# Patient Record
Sex: MALE | Race: WHITE | NOT HISPANIC OR LATINO | Employment: FULL TIME | URBAN - METROPOLITAN AREA
[De-identification: names, ages, dates, MRNs, and addresses within clinical notes are randomized per-mention and may not be internally consistent; named-entity substitution may affect disease eponyms.]

---

## 2018-03-27 ENCOUNTER — OFFICE VISIT CONVERTED (OUTPATIENT)
Dept: SURGERY | Facility: CLINIC | Age: 58
End: 2018-03-27
Attending: SURGERY

## 2020-03-13 ENCOUNTER — HOSPITAL ENCOUNTER (INPATIENT)
Facility: HOSPITAL | Age: 60
LOS: 2 days | Discharge: HOME | End: 2020-03-15
Attending: INTERNAL MEDICINE | Admitting: INTERNAL MEDICINE

## 2020-03-13 ENCOUNTER — APPOINTMENT (OUTPATIENT)
Dept: CARDIOLOGY | Facility: HOSPITAL | Age: 60
End: 2020-03-13

## 2020-03-13 ENCOUNTER — APPOINTMENT (OUTPATIENT)
Dept: MRI IMAGING | Facility: HOSPITAL | Age: 60
End: 2020-03-13

## 2020-03-13 ENCOUNTER — APPOINTMENT (OUTPATIENT)
Dept: CT IMAGING | Facility: HOSPITAL | Age: 60
End: 2020-03-13

## 2020-03-13 PROBLEM — I48.91 ATRIAL FIBRILLATION: Status: ACTIVE | Noted: 2020-03-13

## 2020-03-13 PROBLEM — I10 HYPERTENSION: Status: ACTIVE | Noted: 2020-03-13

## 2020-03-13 PROBLEM — G45.9 TRANSIENT ISCHEMIC ATTACK (TIA): Status: ACTIVE | Noted: 2020-03-13

## 2020-03-13 PROBLEM — I63.9 CVA (CEREBRAL VASCULAR ACCIDENT) (HCC): Status: ACTIVE | Noted: 2020-03-13

## 2020-03-13 PROBLEM — I48.0 PAROXYSMAL ATRIAL FIBRILLATION (HCC): Status: ACTIVE | Noted: 2020-03-13

## 2020-03-13 LAB
ALBUMIN SERPL-MCNC: 3.9 G/DL (ref 3.5–5.2)
ALBUMIN/GLOB SERPL: 1.4 G/DL
ALP SERPL-CCNC: 110 U/L (ref 39–117)
ALT SERPL W P-5'-P-CCNC: 18 U/L (ref 1–41)
ANION GAP SERPL CALCULATED.3IONS-SCNC: 10.3 MMOL/L (ref 5–15)
AORTIC DIMENSIONLESS INDEX: 1.1 (DI)
AST SERPL-CCNC: 17 U/L (ref 1–40)
BH CV ECHO MEAS - ACS: 2.1 CM
BH CV ECHO MEAS - AO MAX PG: 3 MMHG
BH CV ECHO MEAS - AO MEAN PG (FULL): 0 MMHG
BH CV ECHO MEAS - AO MEAN PG: 2 MMHG
BH CV ECHO MEAS - AO ROOT AREA (BSA CORRECTED): 1.7
BH CV ECHO MEAS - AO ROOT AREA: 10.2 CM^2
BH CV ECHO MEAS - AO ROOT DIAM: 3.6 CM
BH CV ECHO MEAS - AO V2 MAX: 89 CM/SEC
BH CV ECHO MEAS - AO V2 MEAN: 63.7 CM/SEC
BH CV ECHO MEAS - AO V2 VTI: 20.4 CM
BH CV ECHO MEAS - AVA(I,A): 4.5 CM^2
BH CV ECHO MEAS - AVA(I,D): 4.5 CM^2
BH CV ECHO MEAS - BSA(HAYCOCK): 2.2 M^2
BH CV ECHO MEAS - BSA: 2.1 M^2
BH CV ECHO MEAS - BZI_BMI: 30.1 KILOGRAMS/M^2
BH CV ECHO MEAS - BZI_METRIC_HEIGHT: 177.8 CM
BH CV ECHO MEAS - BZI_METRIC_WEIGHT: 95.3 KG
BH CV ECHO MEAS - EDV(CUBED): 110.6 ML
BH CV ECHO MEAS - EDV(MOD-SP2): 81 ML
BH CV ECHO MEAS - EDV(MOD-SP4): 103 ML
BH CV ECHO MEAS - EDV(TEICH): 107.5 ML
BH CV ECHO MEAS - EF(CUBED): 84.1 %
BH CV ECHO MEAS - EF(MOD-BP): 68 %
BH CV ECHO MEAS - EF(MOD-SP2): 65.4 %
BH CV ECHO MEAS - EF(MOD-SP4): 67 %
BH CV ECHO MEAS - EF(TEICH): 77.1 %
BH CV ECHO MEAS - ESV(CUBED): 17.6 ML
BH CV ECHO MEAS - ESV(MOD-SP2): 28 ML
BH CV ECHO MEAS - ESV(MOD-SP4): 34 ML
BH CV ECHO MEAS - ESV(TEICH): 24.6 ML
BH CV ECHO MEAS - FS: 45.8 %
BH CV ECHO MEAS - IVS/LVPW: 0.92
BH CV ECHO MEAS - IVSD: 1.1 CM
BH CV ECHO MEAS - LA DIMENSION: 3.5 CM
BH CV ECHO MEAS - LA/AO: 0.97
BH CV ECHO MEAS - LAT PEAK E' VEL: 10.4 CM/SEC
BH CV ECHO MEAS - LV DIASTOLIC VOL/BSA (35-75): 48.3 ML/M^2
BH CV ECHO MEAS - LV MASS(C)D: 206.4 GRAMS
BH CV ECHO MEAS - LV MASS(C)DI: 96.8 GRAMS/M^2
BH CV ECHO MEAS - LV MEAN PG: 2 MMHG
BH CV ECHO MEAS - LV SYSTOLIC VOL/BSA (12-30): 16 ML/M^2
BH CV ECHO MEAS - LV V1 MAX: 115 CM/SEC
BH CV ECHO MEAS - LV V1 MEAN: 72.3 CM/SEC
BH CV ECHO MEAS - LV V1 VTI: 21.9 CM
BH CV ECHO MEAS - LVIDD: 4.8 CM
BH CV ECHO MEAS - LVIDS: 2.6 CM
BH CV ECHO MEAS - LVLD AP2: 7.5 CM
BH CV ECHO MEAS - LVLD AP4: 8.4 CM
BH CV ECHO MEAS - LVLS AP2: 6.4 CM
BH CV ECHO MEAS - LVLS AP4: 6.3 CM
BH CV ECHO MEAS - LVOT AREA (M): 4.2 CM^2
BH CV ECHO MEAS - LVOT AREA: 4.2 CM^2
BH CV ECHO MEAS - LVOT DIAM: 2.3 CM
BH CV ECHO MEAS - LVPWD: 1.2 CM
BH CV ECHO MEAS - MED PEAK E' VEL: 7.7 CM/SEC
BH CV ECHO MEAS - MV A DUR: 0.2 SEC
BH CV ECHO MEAS - MV A MAX VEL: 76.5 CM/SEC
BH CV ECHO MEAS - MV DEC SLOPE: 209 CM/SEC^2
BH CV ECHO MEAS - MV DEC TIME: 0.21 SEC
BH CV ECHO MEAS - MV E MAX VEL: 73.5 CM/SEC
BH CV ECHO MEAS - MV E/A: 0.96
BH CV ECHO MEAS - MV MEAN PG: 1 MMHG
BH CV ECHO MEAS - MV P1/2T MAX VEL: 77.6 CM/SEC
BH CV ECHO MEAS - MV P1/2T: 108.7 MSEC
BH CV ECHO MEAS - MV V2 MEAN: 51.6 CM/SEC
BH CV ECHO MEAS - MV V2 VTI: 28.4 CM
BH CV ECHO MEAS - MVA P1/2T LCG: 2.8 CM^2
BH CV ECHO MEAS - MVA(P1/2T): 2 CM^2
BH CV ECHO MEAS - MVA(VTI): 3.2 CM^2
BH CV ECHO MEAS - PA ACC SLOPE: 2636 CM/SEC^2
BH CV ECHO MEAS - PA ACC TIME: 0.05 SEC
BH CV ECHO MEAS - PA MAX PG (FULL): 5.4 MMHG
BH CV ECHO MEAS - PA MAX PG: 7.3 MMHG
BH CV ECHO MEAS - PA PR(ACCEL): 57 MMHG
BH CV ECHO MEAS - PA V2 MAX: 135 CM/SEC
BH CV ECHO MEAS - PI END-D VEL: 123 CM/SEC
BH CV ECHO MEAS - PULM A REVS DUR: 0.18 SEC
BH CV ECHO MEAS - PULM A REVS VEL: 26.2 CM/SEC
BH CV ECHO MEAS - PULM DIAS VEL: 30.1 CM/SEC
BH CV ECHO MEAS - PULM S/D: 2.1
BH CV ECHO MEAS - PULM SYS VEL: 64 CM/SEC
BH CV ECHO MEAS - PVA(V,A): 1.8 CM^2
BH CV ECHO MEAS - PVA(V,D): 1.8 CM^2
BH CV ECHO MEAS - QP/QS: 0.52
BH CV ECHO MEAS - RV MAX PG: 1.9 MMHG
BH CV ECHO MEAS - RV MEAN PG: 1 MMHG
BH CV ECHO MEAS - RV V1 MAX: 69.2 CM/SEC
BH CV ECHO MEAS - RV V1 MEAN: 47.8 CM/SEC
BH CV ECHO MEAS - RV V1 VTI: 13.6 CM
BH CV ECHO MEAS - RVOT AREA: 3.5 CM^2
BH CV ECHO MEAS - RVOT DIAM: 2.1 CM
BH CV ECHO MEAS - SI(AO): 97.4 ML/M^2
BH CV ECHO MEAS - SI(CUBED): 43.6 ML/M^2
BH CV ECHO MEAS - SI(LVOT): 42.7 ML/M^2
BH CV ECHO MEAS - SI(MOD-SP2): 24.9 ML/M^2
BH CV ECHO MEAS - SI(MOD-SP4): 32.4 ML/M^2
BH CV ECHO MEAS - SI(TEICH): 38.9 ML/M^2
BH CV ECHO MEAS - SV(AO): 207.6 ML
BH CV ECHO MEAS - SV(CUBED): 93 ML
BH CV ECHO MEAS - SV(LVOT): 91 ML
BH CV ECHO MEAS - SV(MOD-SP2): 53 ML
BH CV ECHO MEAS - SV(MOD-SP4): 69 ML
BH CV ECHO MEAS - SV(RVOT): 47.1 ML
BH CV ECHO MEAS - SV(TEICH): 82.9 ML
BH CV ECHO MEAS - TAPSE (>1.6): 2.3 CM2
BH CV ECHO MEASUREMENTS AVERAGE E/E' RATIO: 8.12
BH CV XLRA - RV BASE: 3.7 CM
BH CV XLRA - RV LENGTH: 6.5 CM
BH CV XLRA - RV MID: 2.5 CM
BH CV XLRA - TDI S': 15 CM/SEC
BILIRUB SERPL-MCNC: 0.3 MG/DL (ref 0.2–1.2)
BUN BLD-MCNC: 11 MG/DL (ref 8–23)
BUN/CREAT SERPL: 14.7 (ref 7–25)
CALCIUM SPEC-SCNC: 8.9 MG/DL (ref 8.6–10.5)
CHLORIDE SERPL-SCNC: 107 MMOL/L (ref 98–107)
CHOLEST SERPL-MCNC: 204 MG/DL (ref 0–200)
CO2 SERPL-SCNC: 22.7 MMOL/L (ref 22–29)
CREAT BLD-MCNC: 0.75 MG/DL (ref 0.76–1.27)
DEPRECATED RDW RBC AUTO: 41 FL (ref 37–54)
ERYTHROCYTE [DISTWIDTH] IN BLOOD BY AUTOMATED COUNT: 12.4 % (ref 12.3–15.4)
FOLATE SERPL-MCNC: 10.4 NG/ML (ref 4.78–24.2)
GFR SERPL CREATININE-BSD FRML MDRD: 106 ML/MIN/1.73
GLOBULIN UR ELPH-MCNC: 2.8 GM/DL
GLUCOSE BLD-MCNC: 144 MG/DL (ref 65–99)
GLUCOSE BLDC GLUCOMTR-MCNC: 105 MG/DL (ref 70–130)
GLUCOSE BLDC GLUCOMTR-MCNC: 130 MG/DL (ref 70–130)
GLUCOSE BLDC GLUCOMTR-MCNC: 69 MG/DL (ref 70–130)
HBA1C MFR BLD: 5.83 % (ref 4.8–5.6)
HCT VFR BLD AUTO: 41.3 % (ref 37.5–51)
HDLC SERPL-MCNC: 45 MG/DL (ref 40–60)
HGB BLD-MCNC: 14.4 G/DL (ref 13–17.7)
LDLC SERPL CALC-MCNC: 144 MG/DL (ref 0–100)
LDLC/HDLC SERPL: 3.21 {RATIO}
LEFT ATRIUM VOLUME INDEX: 27 ML/M2
MAXIMAL PREDICTED HEART RATE: 160 BPM
MCH RBC QN AUTO: 31.8 PG (ref 26.6–33)
MCHC RBC AUTO-ENTMCNC: 34.9 G/DL (ref 31.5–35.7)
MCV RBC AUTO: 91.2 FL (ref 79–97)
PLATELET # BLD AUTO: 223 10*3/MM3 (ref 140–450)
PMV BLD AUTO: 9.2 FL (ref 6–12)
POTASSIUM BLD-SCNC: 3.7 MMOL/L (ref 3.5–5.2)
PROT SERPL-MCNC: 6.7 G/DL (ref 6–8.5)
RBC # BLD AUTO: 4.53 10*6/MM3 (ref 4.14–5.8)
SODIUM BLD-SCNC: 140 MMOL/L (ref 136–145)
STRESS TARGET HR: 136 BPM
TRIGL SERPL-MCNC: 73 MG/DL (ref 0–150)
TSH SERPL DL<=0.05 MIU/L-ACNC: 1.56 UIU/ML (ref 0.27–4.2)
VIT B12 BLD-MCNC: 480 PG/ML (ref 211–946)
VLDLC SERPL-MCNC: 14.6 MG/DL (ref 5–40)
WBC NRBC COR # BLD: 5.98 10*3/MM3 (ref 3.4–10.8)

## 2020-03-13 PROCEDURE — 70498 CT ANGIOGRAPHY NECK: CPT

## 2020-03-13 PROCEDURE — 99253 IP/OBS CNSLTJ NEW/EST LOW 45: CPT | Performed by: PSYCHIATRY & NEUROLOGY

## 2020-03-13 PROCEDURE — 93306 TTE W/DOPPLER COMPLETE: CPT | Performed by: INTERNAL MEDICINE

## 2020-03-13 PROCEDURE — 82607 VITAMIN B-12: CPT | Performed by: PSYCHIATRY & NEUROLOGY

## 2020-03-13 PROCEDURE — 70496 CT ANGIOGRAPHY HEAD: CPT

## 2020-03-13 PROCEDURE — 80053 COMPREHEN METABOLIC PANEL: CPT | Performed by: NURSE PRACTITIONER

## 2020-03-13 PROCEDURE — 99254 IP/OBS CNSLTJ NEW/EST MOD 60: CPT | Performed by: NURSE PRACTITIONER

## 2020-03-13 PROCEDURE — 84443 ASSAY THYROID STIM HORMONE: CPT | Performed by: NURSE PRACTITIONER

## 2020-03-13 PROCEDURE — 97162 PT EVAL MOD COMPLEX 30 MIN: CPT | Performed by: PHYSICAL THERAPIST

## 2020-03-13 PROCEDURE — 93010 ELECTROCARDIOGRAM REPORT: CPT | Performed by: INTERNAL MEDICINE

## 2020-03-13 PROCEDURE — 93306 TTE W/DOPPLER COMPLETE: CPT

## 2020-03-13 PROCEDURE — 82962 GLUCOSE BLOOD TEST: CPT

## 2020-03-13 PROCEDURE — 85027 COMPLETE CBC AUTOMATED: CPT | Performed by: NURSE PRACTITIONER

## 2020-03-13 PROCEDURE — 92610 EVALUATE SWALLOWING FUNCTION: CPT

## 2020-03-13 PROCEDURE — 93005 ELECTROCARDIOGRAM TRACING: CPT | Performed by: NURSE PRACTITIONER

## 2020-03-13 PROCEDURE — 70551 MRI BRAIN STEM W/O DYE: CPT

## 2020-03-13 PROCEDURE — 80061 LIPID PANEL: CPT | Performed by: NURSE PRACTITIONER

## 2020-03-13 PROCEDURE — 0 IOPAMIDOL PER 1 ML: Performed by: INTERNAL MEDICINE

## 2020-03-13 PROCEDURE — 83036 HEMOGLOBIN GLYCOSYLATED A1C: CPT | Performed by: PSYCHIATRY & NEUROLOGY

## 2020-03-13 PROCEDURE — 82746 ASSAY OF FOLIC ACID SERUM: CPT | Performed by: PSYCHIATRY & NEUROLOGY

## 2020-03-13 RX ORDER — LISINOPRIL 40 MG/1
40 TABLET ORAL DAILY
COMMUNITY

## 2020-03-13 RX ORDER — ACETAMINOPHEN 325 MG/1
650 TABLET ORAL EVERY 4 HOURS PRN
Status: DISCONTINUED | OUTPATIENT
Start: 2020-03-13 | End: 2020-03-15 | Stop reason: HOSPADM

## 2020-03-13 RX ORDER — SODIUM CHLORIDE 9 MG/ML
75 INJECTION, SOLUTION INTRAVENOUS CONTINUOUS
Status: DISCONTINUED | OUTPATIENT
Start: 2020-03-13 | End: 2020-03-14

## 2020-03-13 RX ORDER — LABETALOL HYDROCHLORIDE 5 MG/ML
10 INJECTION, SOLUTION INTRAVENOUS
Status: DISCONTINUED | OUTPATIENT
Start: 2020-03-13 | End: 2020-03-15 | Stop reason: HOSPADM

## 2020-03-13 RX ORDER — ALUMINA, MAGNESIA, AND SIMETHICONE 2400; 2400; 240 MG/30ML; MG/30ML; MG/30ML
15 SUSPENSION ORAL EVERY 6 HOURS PRN
Status: DISCONTINUED | OUTPATIENT
Start: 2020-03-13 | End: 2020-03-15 | Stop reason: HOSPADM

## 2020-03-13 RX ORDER — ASPIRIN 300 MG/1
300 SUPPOSITORY RECTAL DAILY
Status: DISCONTINUED | OUTPATIENT
Start: 2020-03-13 | End: 2020-03-13

## 2020-03-13 RX ORDER — BISACODYL 10 MG
10 SUPPOSITORY, RECTAL RECTAL DAILY PRN
Status: DISCONTINUED | OUTPATIENT
Start: 2020-03-13 | End: 2020-03-15 | Stop reason: HOSPADM

## 2020-03-13 RX ORDER — ATORVASTATIN CALCIUM 80 MG/1
80 TABLET, FILM COATED ORAL NIGHTLY
Status: DISCONTINUED | OUTPATIENT
Start: 2020-03-13 | End: 2020-03-15 | Stop reason: HOSPADM

## 2020-03-13 RX ORDER — ONDANSETRON 4 MG/1
4 TABLET, FILM COATED ORAL EVERY 6 HOURS PRN
Status: DISCONTINUED | OUTPATIENT
Start: 2020-03-13 | End: 2020-03-15 | Stop reason: HOSPADM

## 2020-03-13 RX ORDER — ASPIRIN 325 MG
325 TABLET ORAL DAILY
Status: DISCONTINUED | OUTPATIENT
Start: 2020-03-13 | End: 2020-03-13

## 2020-03-13 RX ORDER — ASPIRIN 81 MG/1
81 TABLET ORAL DAILY
Status: DISCONTINUED | OUTPATIENT
Start: 2020-03-13 | End: 2020-03-15 | Stop reason: HOSPADM

## 2020-03-13 RX ORDER — DILTIAZEM HYDROCHLORIDE 240 MG/1
240 CAPSULE, COATED, EXTENDED RELEASE ORAL DAILY
Status: DISCONTINUED | OUTPATIENT
Start: 2020-03-13 | End: 2020-03-15 | Stop reason: HOSPADM

## 2020-03-13 RX ORDER — ACETAMINOPHEN 160 MG/5ML
650 SOLUTION ORAL EVERY 4 HOURS PRN
Status: DISCONTINUED | OUTPATIENT
Start: 2020-03-13 | End: 2020-03-15 | Stop reason: HOSPADM

## 2020-03-13 RX ORDER — SODIUM CHLORIDE 0.9 % (FLUSH) 0.9 %
10 SYRINGE (ML) INJECTION EVERY 12 HOURS SCHEDULED
Status: DISCONTINUED | OUTPATIENT
Start: 2020-03-13 | End: 2020-03-15 | Stop reason: HOSPADM

## 2020-03-13 RX ORDER — ONDANSETRON 2 MG/ML
4 INJECTION INTRAMUSCULAR; INTRAVENOUS EVERY 6 HOURS PRN
Status: DISCONTINUED | OUTPATIENT
Start: 2020-03-13 | End: 2020-03-15 | Stop reason: HOSPADM

## 2020-03-13 RX ORDER — IBUPROFEN 800 MG/1
800 TABLET ORAL 2 TIMES DAILY
COMMUNITY
End: 2020-03-15 | Stop reason: HOSPADM

## 2020-03-13 RX ORDER — ASPIRIN 81 MG/1
81 TABLET, CHEWABLE ORAL DAILY
COMMUNITY
End: 2020-04-09

## 2020-03-13 RX ORDER — CYCLOBENZAPRINE HCL 10 MG
10 TABLET ORAL DAILY
COMMUNITY
End: 2020-03-15 | Stop reason: HOSPADM

## 2020-03-13 RX ORDER — CLOPIDOGREL BISULFATE 75 MG/1
75 TABLET ORAL DAILY
Status: DISCONTINUED | OUTPATIENT
Start: 2020-03-13 | End: 2020-03-13

## 2020-03-13 RX ORDER — ACETAMINOPHEN 650 MG/1
650 SUPPOSITORY RECTAL EVERY 4 HOURS PRN
Status: DISCONTINUED | OUTPATIENT
Start: 2020-03-13 | End: 2020-03-15 | Stop reason: HOSPADM

## 2020-03-13 RX ORDER — SODIUM CHLORIDE 0.9 % (FLUSH) 0.9 %
10 SYRINGE (ML) INJECTION AS NEEDED
Status: DISCONTINUED | OUTPATIENT
Start: 2020-03-13 | End: 2020-03-15 | Stop reason: HOSPADM

## 2020-03-13 RX ORDER — DILTIAZEM HYDROCHLORIDE 240 MG/1
240 CAPSULE, COATED, EXTENDED RELEASE ORAL DAILY
COMMUNITY
End: 2020-05-20

## 2020-03-13 RX ADMIN — IOPAMIDOL 95 ML: 755 INJECTION, SOLUTION INTRAVENOUS at 09:50

## 2020-03-13 RX ADMIN — ATORVASTATIN CALCIUM 80 MG: 80 TABLET, FILM COATED ORAL at 02:15

## 2020-03-13 RX ADMIN — SODIUM CHLORIDE 75 ML/HR: 9 INJECTION, SOLUTION INTRAVENOUS at 01:55

## 2020-03-13 RX ADMIN — SODIUM CHLORIDE, PRESERVATIVE FREE 10 ML: 5 INJECTION INTRAVENOUS at 02:16

## 2020-03-13 RX ADMIN — DILTIAZEM HYDROCHLORIDE 240 MG: 240 CAPSULE, COATED, EXTENDED RELEASE ORAL at 08:12

## 2020-03-13 RX ADMIN — SODIUM CHLORIDE, PRESERVATIVE FREE 10 ML: 5 INJECTION INTRAVENOUS at 20:22

## 2020-03-13 RX ADMIN — ASPIRIN 325 MG: 325 TABLET ORAL at 08:12

## 2020-03-13 RX ADMIN — ATORVASTATIN CALCIUM 80 MG: 80 TABLET, FILM COATED ORAL at 20:22

## 2020-03-13 RX ADMIN — METOPROLOL TARTRATE 25 MG: 25 TABLET ORAL at 20:22

## 2020-03-13 RX ADMIN — SODIUM CHLORIDE, PRESERVATIVE FREE 10 ML: 5 INJECTION INTRAVENOUS at 08:13

## 2020-03-13 RX ADMIN — RIVAROXABAN 20 MG: 20 TABLET, FILM COATED ORAL at 17:29

## 2020-03-13 NOTE — PLAN OF CARE
Problem: Patient Care Overview  Goal: Plan of Care Review  3/13/2020 1803 by Verna Ordonez, RN  Flowsheets (Taken 3/13/2020 1803)  Outcome Summary: Lacunar infarct noted.  ECHO completed and cardio consult for history of afib.  To start xarelto and metoprolol this evening.  No complaints this shift.  3/13/2020 1802 by Verna Ordonez, RN  Outcome: Ongoing (interventions implemented as appropriate)  Flowsheets (Taken 3/13/2020 1800)  Progress: improving  Plan of Care Reviewed With: patient;spouse  Outcome Summary: Pt alert and oriented.  NIH 0.  He stills feel some heaviness in left leg.  ST, PT, OT saw.  CTA and MRI completed.  Results discussed with patient and family per neurology.  Goal: Individualization and Mutuality  Outcome: Ongoing (interventions implemented as appropriate)  Goal: Discharge Needs Assessment  Outcome: Ongoing (interventions implemented as appropriate)  Goal: Interprofessional Rounds/Family Conf  Outcome: Ongoing (interventions implemented as appropriate)     Problem: Stroke (Ischemic) (Adult)  Description  Prevent and manage potential problems includin. acute neurologic deterioration  2. bladder/bowel dysfunction  3. cognitive impairment  4. communication impairment  5. eating/swallowing impairment  6. fever  7. hemodynamic instability  8. motor/sensory impairment  9. muscle tone abnormal  10. respiratory compromise  11. situational response  12. skin breakdown  13. VTE (venous thromboembolism)  Goal: Signs and Symptoms of Listed Potential Problems Will be Absent, Minimized or Managed (Stroke)  Description  Signs and symptoms of listed potential problems will be absent, minimized or managed by discharge/transition of care (reference Stroke (Ischemic) (Adult) CPG).  Outcome: Ongoing (interventions implemented as appropriate)

## 2020-03-13 NOTE — CONSULTS
Neurology Note    Patient:  James Snellen    YOB: 1960    REFERRING PHYSICIAN:  Duane Faust MD    CHIEF COMPLAINT:    Let sided heaviness    HISTORY OF PRESENT ILLNESS:   The patient is a 60 y.o. male with h/o PAF, HTN, on low dose aspirin developed left sided heaviness and numbness on getting up from a nap around 1600 yday. He presented to ED at [facility] last night outside of TPA window where his NIHSS was 0, SBP 140s, CTH negative. He has received Plavix 300 mg, transferred here for further work up. Currently symptoms mostly resolved, has been able to eat and walk w/o help. Denies prior stroke symptoms. -170. He has been in NSR.    Past Medical History:  Past Medical History:   Diagnosis Date   • Afib (CMS/HCC)    • Hypertension        Past Surgical History:  History reviewed. No pertinent surgical history.    Social History:   Social History     Socioeconomic History   • Marital status:      Spouse name: Not on file   • Number of children: Not on file   • Years of education: Not on file   • Highest education level: Not on file   Tobacco Use   • Smoking status: Former Smoker   • Smokeless tobacco: Never Used        Family History:   Family History   Problem Relation Age of Onset   • Transient ischemic attack Mother    • Heart attack Father        Medications Prior to Admission:    Prior to Admission medications    Medication Sig Start Date End Date Taking? Authorizing Provider   aspirin 81 MG chewable tablet Chew 81 mg Daily.   Yes Lora Raymond MD   cyclobenzaprine (FLEXERIL) 10 MG tablet Take 10 mg by mouth Daily. 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   Yes Lora Raymond MD   ibuprofen (ADVIL,MOTRIN) 800 MG tablet Take 800 mg by mouth 2 (Two) Times a Day.   Yes Lora Raymond MD   lisinopril (PRINIVIL,ZESTRIL)  40 MG tablet Take 40 mg by mouth Daily.   Yes Provider, Lora, MD       Allergies:  Patient has no known allergies.      Review of system  Review of Systems   Neurological: Positive for weakness and numbness.   All other systems reviewed and are negative.      Vitals:    03/13/20 0744   BP: 165/98   Pulse: 76   Resp: 18   Temp: 97.3 °F (36.3 °C)   SpO2:        Physical exam  Physical Exam   Constitutional: He is oriented to person, place, and time. He appears well-developed and well-nourished.   HENT:   Head: Normocephalic and atraumatic.   Eyes: Pupils are equal, round, and reactive to light. EOM are normal.   Cardiovascular: Normal rate and regular rhythm.   Pulmonary/Chest: Effort normal.   Neurological: He is alert and oriented to person, place, and time. He has normal strength and normal reflexes. He displays normal reflexes. No cranial nerve deficit or sensory deficit. He exhibits normal muscle tone. Coordination normal. He displays no Babinski's sign on the right side. He displays no Babinski's sign on the left side.   Speech clear, VFF, no facial droop or limb drift.   Psychiatric: He has a normal mood and affect. His behavior is normal. Thought content normal.         Lab Results   Component Value Date    WBC 5.98 03/13/2020    HGB 14.4 03/13/2020    HCT 41.3 03/13/2020    MCV 91.2 03/13/2020     03/13/2020     Lab Results   Component Value Date    GLUCOSE 144 (H) 03/13/2020    BUN 11 03/13/2020    CREATININE 0.75 (L) 03/13/2020    EGFRIFNONA 106 03/13/2020    BCR 14.7 03/13/2020    CO2 22.7 03/13/2020    CALCIUM 8.9 03/13/2020    ALBUMIN 3.90 03/13/2020    AST 17 03/13/2020    ALT 18 03/13/2020   Contains abnormal data Lipid Panel   Order: [id number]   Status:  Final result   Visible to patient:  No (Not Released) Next appt:  None   Specimen Information: Blood        Component  Ref Range & Units 04:29   Total Cholesterol  0 - 200 mg/dL 204High     Triglycerides  0 - 150 mg/dL 73    HDL  Cholesterol  40 - 60 mg/dL 45    LDL Cholesterol   0 - 100 mg/dL 144High     VLDL Cholesterol  5 - 40 mg/dL 14.6    LDL/HDL Ratio 3.21              ECG 12 Lead   Order: [id number]   Status:  Preliminary result   Visible to patient:  No (Not Released) Next appt:  None      Narrative & Impression     HEART RATE= 84  bpm  RR Interval= 712  ms  OR Interval= 143  ms  P Horizontal Axis= 17  deg  P Front Axis= 45  deg  QRSD Interval= 99  ms  QT Interval= 368  ms  QRS Axis= -66  deg  T Wave Axis= 65  deg  - ABNORMAL ECG -  Sinus rhythm  Left anterior fascicular block  Electronically Signed By:   Date and Time of Study: 2020-03-13 03:47:01      Specimen Collected: 03/13/20 03:47               Radiological Studies:  Mri Brain Without Contrast    Result Date: 3/13/2020  MRI THE BRAIN WITHOUT CONTRAST 03/13/2020  CLINICAL HISTORY: Left-sided numbness began yesterday.  TECHNIQUE: Axial T1, FLAIR, fat-suppressed T2, axial diffusion and gradient echo T2 and sagittal T1-weighted images were obtained of the entire head.  There are no prior studies for comparison.  FINDINGS: There is an 11 x 9 mm ovoid area of intense increased signal intensity on diffusion-weighted images, mild increased signal intensity on FLAIR and T2-weighted images and signal loss on the ADC maps indicating true cytotoxic edema from an acute lacunar type infarct that extends from the lateral right thalamus into the posterior limb of the right internal capsule. There is scattered tiny foci of T2 high signal in the cerebral white matter that is consistent with very minimal small vessel disease. The remainder of the brain parenchyma is normal in signal intensity. The ventricles are normal in size. I see no mass effect, no midline shift and no extra-axial fluid collections are identified on the gradient echo T2 weighted images. No acute or old blood breakdown products are seen intracranially. There is a tiny amount of fluid in the posterior left maxillary sinus. The  remainder of the paranasal sinuses and mastoid air cells and middle ear cavities are clear. Good flow voids are demonstrated within the cerebral vessels and in the dural venous sinuses.      1. There is a 11 x 9 mm ovoid acute lacunar infarct extending from the lateral right thalamus into the posterior limb of the right internal capsule undoubtedly accounting for the patient's acute onset left-sided numbness that began yesterday. 2. Very minimal small vessel disease in the cerebral white matter. 3. Tiny amount of fluid in the posterior left maxillary sinus. The remainder of the MRI of the brain is normal.  The results were communicated to both Dr. Jair Benjamin as well as Dr. Ryder Whitman from Stroke Neurology by telephone 03/13/2020 at 9:00 AM.  This report was finalized on 3/13/2020 10:17 AM by Dr. Jovanni Issa M.D.        During this visit the following were done:  Labs Reviewed [x]    Labs Ordered []    Radiology Reports Reviewed [x]    Radiology Ordered []    EKG, echo, and/or stress test reviewed [x]    EEG results reviewed  []    EEG reviewed and interpreted per myself   []    Discussed case with neurointerventionalist or neuroradiologist []    Referring Provider Records Reviewed []    ER Records Reviewed [x]    Hospital Records Reviewed []    History Obtained From Family []    Radiological images view and Interpreted per myself [x]    Case Discussed with referring provider []     Decision to obtain and request outside records  []        Assessment and Plan     Acute right thalamic stoke, apparent small vessel thrombosis 22 HTN, HLP. Likely not cardioembolic given location. Patient with h/o PAF, currently in NSR. No TPA since symptoms resolved.    - Observation on telemetry.   - Continue aspirin, Plavix, high dose statin.   - F/U CTA and TTE reports.   - ZIO patch on discharge.      Thanks,               Electronically signed by Zack Whitman MD on 3/13/2020 at 12:14

## 2020-03-13 NOTE — THERAPY EVALUATION
Acute Care - Speech Language Pathology   Swallow Initial Evaluation [facility]     Patient Name: James Snellen  : 1960  MRN: [id number]  Today's Date: 3/13/2020               Admit Date: 3/13/2020    Visit Dx:   No diagnosis found.  Patient Active Problem List   Diagnosis   • Paroxysmal atrial fibrillation (CMS/HCC)   • Hypertension   • Transient ischemic attack (TIA)   • CVA (cerebral vascular accident) (CMS/HCC)     Past Medical History:   Diagnosis Date   • Afib (CMS/HCC)    • Hypertension      History reviewed. No pertinent surgical history.     SWALLOW EVALUATION (last 72 hours)      SLP Adult Swallow Evaluation     Row Name 20 1500                   Pain Assessment    Additional Documentation  Pain Scale: Numbers Pre/Post-Treatment (Group)  (Pended)   -TW           Pain Scale: Numbers Pre/Post-Treatment    Pain Scale: Numbers, Pretreatment  0/10 - no pain  (Pended)   -TW        Pain Scale: Numbers, Post-Treatment  0/10 - no pain  (Pended)   -TW           Oral Motor and Function    Dentition Assessment  natural, present and adequate  (Pended)   -TW        Secretion Management  WNL/WFL  (Pended)   -TW        Mucosal Quality  moist, healthy  (Pended)   -TW        Volitional Swallow  WFL  (Pended)   -TW           Oral Musculature and Cranial Nerve Assessment    Oral Motor General Assessment  WFL  (Pended)   -TW           General Eating/Swallowing Observations    Respiratory Support Currently in Use  room air  (Pended)   -TW        Eating/Swallowing Skills  self-fed  (Pended)   -TW        Positioning During Eating  upright in bed  (Pended)   -TW        Utensils Used  spoon;straw;cup  (Pended)   -TW        Consistencies Trialed  regular textures;soft textures;pureed;thin liquids  (Pended)   -TW           Clinical Swallow Eval    Oral Prep Phase  WFL  (Pended)   -TW        Oral Transit  WFL  (Pended)   -TW        Oral Residue  WFL  (Pended)   -TW        Pharyngeal Phase  no overt signs/symptoms of  pharyngeal impairment  (Pended)   -TW        Clinical Swallow Evaluation Summary  Bedside swallow evaluation was completed. Pt presents with functional oropharyngeal swallow. No s/s of asp. Laryngeal palpation indicated functional hyolaryngeal elevation.  Mastication was timely and appropriate. SLP recommend regular diet; thin liquids; meds as tolerated; upright positioning with PO and thirty minutes after; slow rate; small bites and sips.   (Pended)   -TW           Clinical Impression    SLP Swallowing Diagnosis  functional oral phase;functional pharyngeal phase  (Pended)   -TW        Functional Impact  no impact on function  (Pended)   -TW        Rehab Potential/Prognosis, Swallowing  good, to achieve stated therapy goals  (Pended)   -TW        Swallow Criteria for Skilled Therapeutic Interventions Met  no problems identified which require skilled intervention  (Pended)   -TW           Recommendations    Therapy Frequency (Swallow)  evaluation only  (Pended)   -TW        SLP Diet Recommendation  regular textures;thin liquids  (Pended)   -TW        Recommended Precautions and Strategies  small bites of food and sips of liquid;upright posture during/after eating  (Pended)   -TW        SLP Rec. for Method of Medication Administration  as tolerated  (Pended)   -TW        Monitor for Signs of Aspiration  yes;notify SLP if any concerns  (Pended)   -TW        Anticipated Dischage Disposition  unknown  (Pended)   -TW          User Key  (r) = Recorded By, (t) = Taken By, (c) = Cosigned By    Initials Name Effective Dates    TW Leonila Leigh, Speech Therapy Student 01/21/20 -           EDUCATION  The patient has been educated in the following areas:   Dysphagia (Swallowing Impairment).    SLP Recommendation and Plan  SLP Swallowing Diagnosis: (P) functional oral phase, functional pharyngeal phase  SLP Diet Recommendation: (P) regular textures, thin liquids  Recommended Precautions and Strategies: (P) small bites of food  and sips of liquid, upright posture during/after eating  SLP Rec. for Method of Medication Administration: (P) as tolerated     Monitor for Signs of Aspiration: (P) yes, notify SLP if any concerns     Swallow Criteria for Skilled Therapeutic Interventions Met: (P) no problems identified which require skilled intervention  Anticipated Dischage Disposition: (P) unknown  Rehab Potential/Prognosis, Swallowing: (P) good, to achieve stated therapy goals  Therapy Frequency (Swallow): (P) evaluation only          Plan of Care Reviewed With: (P) patient, spouse, daughter  Outcome Summary: (P) Bedside swallow evaluation was completed. Pt presents with functional oropharyngeal swallow. No s/s of asp. Laryngeal palpation indicated functional hyolaryngeal elevation.  Mastication was timely and appropriate. SLP recommend regular diet; thin liquids; meds as tolerated; upright positioning with PO and thirty minutes after; slow rate; small bites and sips. ST to sign off. Reconsult as needed.         SLP Outcome Measures (last 72 hours)      SLP Outcome Measures     Row Name 03/13/20 1500             SLP Outcome Measures    Outcome Measure Used?  Adult NOMS  (Pended)   -         Adult FCM Scores    FCM Chosen  Swallowing  (Pended)   -      Swallowing FCM Score  7  (Pended)   -        User Key  (r) = Recorded By, (t) = Taken By, (c) = Cosigned By    Initials Name Effective Dates    Leonila Bull Speech Therapy Student 01/21/20 -            Time Calculation:   Time Calculation- SLP     Row Name 03/13/20 1525             Time Calculation- SLP    SLP Start Time  1300  (Pended)   -      SLP Received On  03/13/20  (Pended)   -        User Key  (r) = Recorded By, (t) = Taken By, (c) = Cosigned By    Initials Name Provider Type    Leonila Bull, Speech Therapy Student Speech Therapy Student          Therapy Charges for Today     Code Description Service Date Service Provider Modifiers Qty    [id number]  ST EVAL ORAL  PHARYNG SWALLOW 4 3/13/2020 Leonila Leigh, Speech Therapy Student GN 1               Leonila Leigh, Speech Therapy Student  3/13/2020

## 2020-03-13 NOTE — CONSULTS
Date of Hospital Visit: 20  Encounter Provider: GRADY Mcintosh  Place of Service: [facility]  Patient Name: James Snellen  :1960  [id number]  Referral Provider: Duane Faust MD    Chief complaint: Status post CVA, history of paroxysmal A. fib    History of Present Illness: David Webb is a 6-year-old male who is a previous patient of a cardiologist at [place].  He has had a history of paroxysmal A. fib for the last 10 years.  He had a chads score of 1 and is just been on aspirin at home.  He also has a history of hypertension.  He has not had any work-up for coronary disease in the last 10 years but had a stress echo in  that was normal.  He is an ex-smoker.    Patient woke with a sudden onset of left-sided weakness numbness and tingling.  He presented to the emergency room and was shown to have an acute lacunar infarct.  He does from time to time get palpitations.  He has been maintained on diltiazem ER at home.  He initially went to an outlying emergency room and tells me that he noted his heart rate to be elevated at 160 there.  There is no documentation or EKG to review.    Past Medical History:   Diagnosis Date   • Afib (CMS/[facility])    • Hypertension        History reviewed. No pertinent surgical history.    Prior to Admission medications    Medication Sig Start Date End Date Taking? Authorizing Provider   aspirin 81 MG chewable tablet Chew 81 mg Daily.   Yes Lora Raymond MD   cyclobenzaprine (FLEXERIL) 10 MG tablet Take 10 mg by mouth Daily. 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   Yes Lora Raymond MD   ibuprofen (ADVIL,MOTRIN) 800 MG tablet Take 800 mg by mouth 2 (Two) Times a Day.   Yes Lora Raymond MD   lisinopril (PRINIVIL,ZESTRIL) 40 MG tablet Take 40 mg by mouth Daily.    Yes Provider, MD Lora        Allergies as of 2020   No known drug allergies        Social History     Socioeconomic History   • Marital status:      Spouse name: Not on file   • Number of children: Not on file   • Years of education: Not on file   • Highest education level: Not on file   Tobacco Use   • Smoking status: Former Smoker   • Smokeless tobacco: Never Used       Family History   Problem Relation Age of Onset   • Transient ischemic attack Mother    • Heart attack Father    Father  in his 60s of an MI, he has 2 brothers with coronary disease and stenting.    REVIEW OF SYSTEMS:   ROS was performed and is negative except as outlined in HPI     REVIEW OF SYSTEMS:   CONSTITUTIONAL: No weight loss, fever, chills, weakness or fatigue.   HEENT: Eyes: No visual loss, blurred vision, double vision or yellow sclerae. Ears, Nose, Throat: No hearing loss, sneezing, congestion, runny nose or sore throat.   SKIN: No rash or itching.    CARDIAC: Positive palpitations, no chest pain, no edema  RESPIRATORY: No shortness of breath, hemoptysis, cough or sputum.   GASTROINTESTINAL: No anorexia, nausea, vomiting or diarrhea. No abdominal pain, bright red blood per rectum or melena.  GENITOURINARY: No burning on urination, hematuria or increased frequency.  NEUROLOGICAL: No headache, dizziness, syncope, paralysis, complains of left-sided weakness and numbness in the upper and lower extremity.  No change in speech. no change in bowel or bladder control.   MUSCULOSKELETAL: No muscle, back pain, joint pain or stiffness.   HEMATOLOGIC: No anemia, bleeding or bruising.   LYMPHATICS: No enlarged nodes. No history of splenectomy.   PSYCHIATRIC: No history of depression, anxiety, hallucinations.   ENDOCRINOLOGIC: No reports of sweating, cold or heat intolerance. No polyuria or polydipsia.        Objective:   Temp:  [97.3 °F (36.3 °C)-98.3 °F (36.8 °C)] 98.3 °F (36.8 °C)  Heart Rate:  [71-76] 76  Resp:  [18]  "18  BP: (161-171)/() 161/100  Body mass index is 30.13 kg/m².  Flowsheet Rows      First Filed Value   Admission Height  177.8 cm (70\") Documented at 03/13/2020 0046   Admission Weight  95.6 kg (210 lb 11.2 oz) Documented at 03/13/2020 0046        Vitals:    03/13/20 1255   BP: 161/100   Pulse: 76   Resp:    Temp:    SpO2:        Physical Exam:   General Appearance:    Awake alert and oriented in no acute distress.   Color:  Skin:  Neuro:  HEENT:    Lungs:     Pink  Warm and dry  No focal, motor or sensory deficits  Neck supple, pupils equal, round and reactive. No JVD, No Bruit  Clear to auscultation,respirations regular, even and                  unlabored    Heart:    Regular rate and rhythm, S1 and S2, no murmur, no gallop, no rub. No edema, DP/PT pulses are 2+   Chest Wall:    No abnormalities observed   Abdomen:     Normal bowel sounds, no masses, no organomegaly, soft        non-tender, non-distended, no guarding, no ascites noted   Extremities:   Moves all extremities well, no edema, no cyanosis, no redness               I personally viewed and interpreted the patient's EKG/Telemetry data    1.  History of paroxysmal A. Fib- CHADSVASC2 of 3   2.  Uncontrolled hypertension  3.  Acute CVA  4.  Ex-smoker      Plan: Patient is in sinus rhythm at this time.  He is having some PVCs mom in the room.  I would recommend adding a beta-blocker to his regimen, begin a start metoprolol tartrate 25 mg twice a day.  He can stay on his diltiazem as well.  His blood pressure is elevated right now 160/100 adding the beta-blocker can help with his hypertension as well as help control his palpitations and rhythm.  He has a chads vascular score of 3 now.  Would recommend oral anticoagulant, I discussed with the patient his options and he would like to start Xarelto.  He would need 20 mg a day neurology is also ordered Plavix the nurses can a check to make sure they want him on both.  She will let me know.  His thyroid " function is also normal, And he just got back from getting an echocardiogram.     Neurology got back to the nurse.  Working to hold Plavix and okay to start him on Xarelto 20 mg daily.  Will change him to an 81 mg aspirin.    GRADY Mcintosh  03/13/20  14:23.  Electronically signed by GRADY Mcintosh, 03/13/20, 2:20 PM.

## 2020-03-13 NOTE — PLAN OF CARE
Problem: Patient Care Overview  Goal: Plan of Care Review  Flowsheets (Taken 3/13/2020 1112)  Outcome Summary: Pt was admitted from TIA workup. MRI is positive for acute lacunar infarct. Pt presents with minimal weakness in L hip and impaired high level dynamic balance. Pt would benfit from PT to address theses impairments and work towards the outlined goals. Pt is safe to d/c home if medically stable, but would benefit from outpt PT referral for balance.

## 2020-03-13 NOTE — PLAN OF CARE
Problem: Patient Care Overview  Goal: Plan of Care Review  Outcome: Ongoing (interventions implemented as appropriate)  Flowsheets (Taken 3/13/2020 9462)  Plan of Care Reviewed With: patient; spouse; daughter  Outcome Summary: Bedside swallow evaluation was completed. Pt presents with functional oropharyngeal swallow. No s/s of asp. Laryngeal palpation indicated functional hyolaryngeal elevation.  Mastication was timely and appropriate. SLP recommend regular diet; thin liquids; meds as tolerated; upright positioning with PO and thirty minutes after; slow rate; small bites and sips. ST to sign off. Reconsult as needed.

## 2020-03-13 NOTE — H&P
Patient Name:  James Snellen  YOB: 1960  MRN:  [id number]  Admit Date:  3/13/2020  Patient Care Team:  Navi Mosqueda as PCP - General (Family Medicine)      Subjective   History Present Illness     CC: Left arm and left leg heaviness     History of Present Illness   Mr. Snellen is a 60 y.o. former smoker with a history of atrial fibrillation and hypertension that presents to [facility] complaining of left arm and left leg heaviness.  He currently works night shift and today after his usual nap he woke up around 4 PM with left leg heaviness.  He states that his left arm was also slightly heavy.  He assumed that he had laid on his left side too long but the heaviness/numbness did not go away.  He took 3 aspirins and decided to go to [facility].  He was transferred here for further work-up.  He denies dizziness, syncope, headaches, and dysarthria.  He also denies shortness of air, chest pain, nausea, vomiting, diarrhea, fever, chills, and night sweats.      Review of Systems   Constitutional: Negative for chills and fever.   HENT: Negative for congestion and rhinorrhea.    Eyes: Negative for photophobia and visual disturbance.   Respiratory: Negative for cough and shortness of breath.    Cardiovascular: Negative for chest pain and palpitations.   Gastrointestinal: Negative for constipation, diarrhea, nausea and vomiting.   Endocrine: Negative for cold intolerance and heat intolerance.   Genitourinary: Negative for difficulty urinating and dysuria.   Musculoskeletal: Negative for back pain and joint swelling.   Skin: Negative for rash and wound.   Neurological: Negative for dizziness, light-headedness, numbness and headaches.   Psychiatric/Behavioral: Negative for sleep disturbance and suicidal ideas.        Personal History     Past Medical History:   Diagnosis Date   • Afib (CMS/HCC)    • Hypertension      History reviewed. No pertinent surgical history.  Family History   Problem  Relation Age of Onset   • Transient ischemic attack Mother    • Heart attack Father      Social History     Tobacco Use   • Smoking status: Former Smoker   • Smokeless tobacco: Never Used   Substance Use Topics   • Alcohol use: Not on file   • Drug use: Not on file     No current facility-administered medications on file prior to encounter.      Current Outpatient Medications on File Prior to Encounter   Medication Sig Dispense Refill   • aspirin 81 MG chewable tablet Chew 81 mg Daily.     • cyclobenzaprine (FLEXERIL) 10 MG tablet Take 10 mg by mouth Daily. Patient works night shift and takes in the morning to help with sleep     • dilTIAZem CD (CARDIZEM CD) 240 MG 24 hr capsule Take 240 mg by mouth Daily.     • ibuprofen (ADVIL,MOTRIN) 800 MG tablet Take 800 mg by mouth 2 (Two) Times a Day.     • lisinopril (PRINIVIL,ZESTRIL) 40 MG tablet Take 40 mg by mouth Daily.       No Known Allergies    Objective    Objective     Vital Signs  Temp:  [97.9 °F (36.6 °C)] 97.9 °F (36.6 °C)  Heart Rate:  [71] 71  Resp:  [18] 18  BP: (171)/(105) 171/105  SpO2:  [97 %] 97 %  on   ;   Device (Oxygen Therapy): room air  Body mass index is 30.23 kg/m².    Physical Exam   Constitutional: He is oriented to person, place, and time. He appears well-developed and well-nourished.   HENT:   Head: Normocephalic and atraumatic.   Eyes: Pupils are equal, round, and reactive to light. Conjunctivae and EOM are normal.   Neck: Normal range of motion. Neck supple. No JVD present.   Cardiovascular: Normal rate, regular rhythm and normal heart sounds. Exam reveals no friction rub.   No murmur heard.  Pulmonary/Chest: Effort normal and breath sounds normal. No respiratory distress. He has no wheezes. He has no rales.   Abdominal: Soft. Bowel sounds are normal. He exhibits no distension. There is no tenderness.   Musculoskeletal: Normal range of motion. He exhibits no edema, tenderness or deformity.   Neurological: He is alert and oriented to person,  place, and time. No cranial nerve deficit.   Skin: Skin is warm and dry. Capillary refill takes less than 2 seconds.   Psychiatric: He has a normal mood and affect. His behavior is normal. Judgment and thought content normal.       Results Review:  I reviewed the patient's new clinical results.  Discussed with ED provider.    Lab Results (last 24 hours)     ** No results found for the last 24 hours. **          Imaging Results (Last 24 Hours)     ** No results found for the last 24 hours. **               No orders to display        Assessment/Plan     Active Hospital Problems    Diagnosis  POA   • **Transient ischemic attack (TIA) [G45.9]  Yes   • Paroxysmal atrial fibrillation (CMS/HCC) [I48.0]  Yes   • Hypertension [I10]  Yes      Resolved Hospital Problems   No resolved problems to display.     TIA  -Left leg and arm heaviness resolving  -Stroke work up  -CT of head at outside facility negative for CVA per transfer records.   -Neurology consult  -NPO until bedside swallow has been passed  -NIHSS    Paroxysmal Afib  -Currently in NSR  -Continue CCB  -No OAC    Hypertension  -Stable  -Hold antihypertensives in the setting of r/o CVA      I discussed the patient's findings and my recommendations with the patient at bedside.      VTE Prophylaxis - SCDs  Code Status - Full       GRADY Bartlett  [facility]  03/13/20  02:50

## 2020-03-13 NOTE — THERAPY EVALUATION
Patient Name: James Snellen  : 1960    MRN: [id number]                              Today's Date: 3/13/2020       Admit Date: 3/13/2020    Visit Dx: No diagnosis found.  Patient Active Problem List   Diagnosis   • Paroxysmal atrial fibrillation (CMS/HCC)   • Hypertension   • Transient ischemic attack (TIA)   • CVA (cerebral vascular accident) (CMS/HCC)     Past Medical History:   Diagnosis Date   • Afib (CMS/HCC)    • Hypertension      History reviewed. No pertinent surgical history.  General Information     Row Name 20 1102          PT Evaluation Time/Intention    Document Type  evaluation  -     Mode of Treatment  individual therapy;group therapy  -     Row Name 20 1102          General Information    Prior Level of Function  independent:  -     Existing Precautions/Restrictions  fall  -     Barriers to Rehab  none identified  -     Row Name 20 1102          Relationship/Environment    Lives With  spouse  -     Row Name 20 1102          Resource/Environmental Concerns    Current Living Arrangements  home/apartment/condo  -     Row Name 20 1102          Home Main Entrance    Number of Stairs, Main Entrance  two  -     Row Name 20 1102          Cognitive Assessment/Intervention- PT/OT    Orientation Status (Cognition)  oriented x 4  -     Row Name 20 1102          Safety Issues, Functional Mobility    Impairments Affecting Function (Mobility)  balance  -       User Key  (r) = Recorded By, (t) = Taken By, (c) = Cosigned By    Initials Name Provider Type     Emilia Casas, PT Physical Therapist        Mobility     Row Name 20 1102          Bed Mobility Assessment/Treatment    Bed Mobility Assessment/Treatment  bed mobility (all) activities  -     [place] Level (Bed Mobility)  independent  -     Row Name 20 1102          Sit-Stand Transfer    Sit-Stand [place] (Transfers)  supervision  -     Row Name 20  1102          Gait/Stairs Assessment/Training    [place] Level (Gait)  supervision  -     Distance in Feet (Gait)  200 ft  -     [place] Level (Stairs)  contact guard  -     Handrail Location (Stairs)  right side (ascending)  -     Number of Steps (Stairs)  12  -KH     Ascending Technique (Stairs)  step-over-step  -     Descending Technique (Stairs)  step-over-step  -       User Key  (r) = Recorded By, (t) = Taken By, (c) = Cosigned By    Initials Name Provider Type    Emilia Whyte, PT Physical Therapist        Obj/Interventions     Row Name 03/13/20 1103          General ROM    GENERAL ROM COMMENTS  WFL  -     Row Name 03/13/20 1103          MMT (Manual Muscle Testing)    General MMT Comments  RLE 5/5. LLE 5/5 except hip flexion 4/5  -     Row Name 03/13/20 1103          Static Sitting Balance    Level of [place] (Unsupported Sitting, Static Balance)  independent  -     Sitting Position (Unsupported Sitting, Static Balance)  sitting on edge of bed  -     Row Name 03/13/20 1103          Dynamic Sitting Balance    Level of [place], Reaches Outside Midline (Sitting, Dynamic Balance)  independent  -     Row Name 03/13/20 1103          Static Standing Balance    Level of [place] (Supported Standing, Static Balance)  independent  -     Row Name 03/13/20 1103          Dynamic Standing Balance    Level of [place], Reaches Outside Midline (Standing, Dynamic Balance)  minimal assist, 75% patient effort;moderate assist, 50 to 74% patient effort  -     Comment, Reaches Outside Midline (Standing, Dynamic Balance)  mod A with tandem walking, min A with braiding and SLS  -       User Key  (r) = Recorded By, (t) = Taken By, (c) = Cosigned By    Initials Name Provider Type    Emilia Whyte, PT Physical Therapist        Goals/Plan     Row Name 03/13/20 1109          Gait Training Goal 1 (PT)    Activity/Assistive Device (Gait Training Goal 1, PT)   gait (walking locomotion)  -     [place] Level (Gait Training Goal 1, PT)  independent  -     Distance (Gait Goal 1, PT)  300 ft  -     Time Frame (Gait Training Goal 1, PT)  5 days  -     Row Name 03/13/20 1109          Patient Education Goal (PT)    Activity (Patient Education Goal, PT)  Pt will demonstrated CGA for high level balance activity  -     [place]/Cues/Accuracy (Memory Goal 2, PT)  demonstrates adequately  -     Time Frame (Patient Education Goal, PT)  5 days  -       User Key  (r) = Recorded By, (t) = Taken By, (c) = Cosigned By    Initials Name Provider Type    Emilia Whyte, PT Physical Therapist        Clinical Impression     [facility] Name 03/13/20 1108          Pain Assessment    Additional Documentation  Pain Scale: Numbers Pre/Post-Treatment (Group)  -KH     Row Name 03/13/20 1108          Pain Scale: Numbers Pre/Post-Treatment    Pain Scale: Numbers, Pretreatment  0/10 - no pain  -     Pain Scale: Numbers, Post-Treatment  0/10 - no pain  -[facility] Name 03/13/20 1108          Plan of Care Review    Plan of Care Reviewed With  patient;spouse  -KH     Row Name 03/13/20 1108          Physical Therapy Clinical Impression    Criteria for Skilled Interventions Met (PT Clinical Impression)  treatment indicated;yes  -     Rehab Potential (PT Clinical Summary)  good, to achieve stated therapy goals  -[facility] Name 03/13/20 1108          Positioning and Restraints    Pre-Treatment Position  in bed  -     Post Treatment Position  chair  -     In Chair  sitting;with nsg;with family/caregiver nursing changing his bed  -       User Key  (r) = Recorded By, (t) = Taken By, (c) = Cosigned By    Initials Name Provider Type    Emilia Whyte, PT Physical Therapist        Outcome Measures     Row Name 03/13/20 1112          How much help from another person do you currently need...    Turning from your back to your side while in flat bed without using  bedrails?  4  -KH     Moving from lying on back to sitting on the side of a flat bed without bedrails?  4  -KH     Moving to and from a bed to a chair (including a wheelchair)?  4  -KH     Standing up from a chair using your arms (e.g., wheelchair, bedside chair)?  4  -KH     Climbing 3-5 steps with a railing?  3  -KH     To walk in hospital room?  4  -KH     AM-PAC 6 Clicks Score (PT)  23  -KH     Row Name 03/13/20 1112          Functional Assessment    Outcome Measure Options  AM-PAC 6 Clicks Basic Mobility (PT)  -       User Key  (r) = Recorded By, (t) = Taken By, (c) = Cosigned By    Initials Name Provider Type    Emilia Whyte PT Physical Therapist          PT Recommendation and Plan  Planned Therapy Interventions (PT Eval): balance training, gait training, strengthening  Outcome Summary/Treatment Plan (PT)  Anticipated Discharge Disposition (PT): home with OP services  Plan of Care Reviewed With: patient, spouse  Outcome Summary: Pt was admitted from TIA workup. MRI is positive for acute lacunar infarct. Pt presents with minimal weakness in L hip and impaired high level dynamic balance. Pt would benfit from PT to address theses impairments and work towards the outlined goals. Pt is safe to d/c home if medically stable, but would benefit from outpt PT referral for balance.     Time Calculation:   PT Charges     Row Name 03/13/20 1101             Time Calculation    Start Time  1032  -      Stop Time  1045  -      Time Calculation (min)  13 min  -KH      PT Received On  03/13/20  -      PT - Next Appointment  03/14/20  -      PT Goal Re-Cert Due Date  03/18/20  -        User Key  (r) = Recorded By, (t) = Taken By, (c) = Cosigned By    Initials Name Provider Type    Emilia Whyte PT Physical Therapist        Therapy Charges for Today     Code Description Service Date Service Provider Modifiers Qty    36569185778 HC PT EVAL MOD COMPLEXITY 2 3/13/2020 Emilia Casas  PT GP 1          PT G-Codes  Outcome Measure Options: AM-PAC 6 Clicks Basic Mobility (PT)  AM-PAC 6 Clicks Score (PT): 23    Emilia Casas, PT  3/13/2020

## 2020-03-14 PROBLEM — I63.81 LACUNAR STROKE: Status: ACTIVE | Noted: 2020-03-13

## 2020-03-14 LAB
ANION GAP SERPL CALCULATED.3IONS-SCNC: 9.5 MMOL/L (ref 5–15)
BUN BLD-MCNC: 10 MG/DL (ref 8–23)
BUN/CREAT SERPL: 14.5 (ref 7–25)
CALCIUM SPEC-SCNC: 8.9 MG/DL (ref 8.6–10.5)
CHLORIDE SERPL-SCNC: 106 MMOL/L (ref 98–107)
CO2 SERPL-SCNC: 21.5 MMOL/L (ref 22–29)
CREAT BLD-MCNC: 0.69 MG/DL (ref 0.76–1.27)
DEPRECATED RDW RBC AUTO: 40.7 FL (ref 37–54)
ERYTHROCYTE [DISTWIDTH] IN BLOOD BY AUTOMATED COUNT: 12.2 % (ref 12.3–15.4)
GFR SERPL CREATININE-BSD FRML MDRD: 117 ML/MIN/1.73
GLUCOSE BLD-MCNC: 88 MG/DL (ref 65–99)
HCT VFR BLD AUTO: 43 % (ref 37.5–51)
HGB BLD-MCNC: 14.6 G/DL (ref 13–17.7)
MCH RBC QN AUTO: 30.9 PG (ref 26.6–33)
MCHC RBC AUTO-ENTMCNC: 34 G/DL (ref 31.5–35.7)
MCV RBC AUTO: 90.9 FL (ref 79–97)
PLATELET # BLD AUTO: 220 10*3/MM3 (ref 140–450)
PMV BLD AUTO: 9.1 FL (ref 6–12)
POTASSIUM BLD-SCNC: 4.1 MMOL/L (ref 3.5–5.2)
RBC # BLD AUTO: 4.73 10*6/MM3 (ref 4.14–5.8)
SODIUM BLD-SCNC: 137 MMOL/L (ref 136–145)
WBC NRBC COR # BLD: 5.4 10*3/MM3 (ref 3.4–10.8)

## 2020-03-14 PROCEDURE — 85027 COMPLETE CBC AUTOMATED: CPT | Performed by: INTERNAL MEDICINE

## 2020-03-14 PROCEDURE — 99233 SBSQ HOSP IP/OBS HIGH 50: CPT | Performed by: NURSE PRACTITIONER

## 2020-03-14 PROCEDURE — 99232 SBSQ HOSP IP/OBS MODERATE 35: CPT | Performed by: INTERNAL MEDICINE

## 2020-03-14 PROCEDURE — 97110 THERAPEUTIC EXERCISES: CPT

## 2020-03-14 PROCEDURE — 80048 BASIC METABOLIC PNL TOTAL CA: CPT | Performed by: INTERNAL MEDICINE

## 2020-03-14 RX ORDER — CLOPIDOGREL BISULFATE 75 MG/1
75 TABLET ORAL DAILY
Status: DISCONTINUED | OUTPATIENT
Start: 2020-03-14 | End: 2020-03-15 | Stop reason: HOSPADM

## 2020-03-14 RX ORDER — LISINOPRIL 40 MG/1
40 TABLET ORAL
Status: DISCONTINUED | OUTPATIENT
Start: 2020-03-14 | End: 2020-03-15 | Stop reason: HOSPADM

## 2020-03-14 RX ADMIN — CLOPIDOGREL 75 MG: 75 TABLET, FILM COATED ORAL at 16:08

## 2020-03-14 RX ADMIN — METOPROLOL TARTRATE 25 MG: 25 TABLET ORAL at 21:12

## 2020-03-14 RX ADMIN — ATORVASTATIN CALCIUM 80 MG: 80 TABLET, FILM COATED ORAL at 22:51

## 2020-03-14 RX ADMIN — SODIUM CHLORIDE, PRESERVATIVE FREE 10 ML: 5 INJECTION INTRAVENOUS at 21:12

## 2020-03-14 RX ADMIN — LISINOPRIL 40 MG: 40 TABLET ORAL at 16:08

## 2020-03-14 RX ADMIN — ASPIRIN 81 MG: 81 TABLET, COATED ORAL at 08:22

## 2020-03-14 RX ADMIN — METOPROLOL TARTRATE 25 MG: 25 TABLET ORAL at 08:22

## 2020-03-14 RX ADMIN — SODIUM CHLORIDE, PRESERVATIVE FREE 10 ML: 5 INJECTION INTRAVENOUS at 08:23

## 2020-03-14 RX ADMIN — DILTIAZEM HYDROCHLORIDE 240 MG: 240 CAPSULE, COATED, EXTENDED RELEASE ORAL at 08:22

## 2020-03-14 NOTE — PLAN OF CARE
Problem: Patient Care Overview  Goal: Plan of Care Review  Outcome: Ongoing (interventions implemented as appropriate)  Flowsheets (Taken 3/14/2020 1042)  Progress: improving  Plan of Care Reviewed With: patient;spouse  Outcome Summary: Pt agreeable to walk with therapy this date. Pt able to independently perform bed mobility. Bed alarm was not on upon entering the room. Pt pushed his IV pole and ambulated 450' around the RN station with SBA/supervision. Pt reported he only feels weak in his left hip, otherwise feels like he is at his baseline. Pt would benefit from OP PT to work on higher level balance activities, but is otherwise safe to return home with family if medically stable.

## 2020-03-14 NOTE — PROGRESS NOTES
" LOS: 1 day     Name: James Snellen  Age: 60 y.o.  Sex: male  :  1960  MRN: [id number]         Primary Care Physician: Navi Mosqueda    Subjective   Subjective  Feels back to baseline.  Left-sided numbness is entirely resolved.  Ambulating without difficulty.  Blood pressure still running high.    Objective   Vital Signs  Temp:  [97.6 °F (36.4 °C)-98.5 °F (36.9 °C)] 97.6 °F (36.4 °C)  Heart Rate:  [66-86] 66  Resp:  [18] 18  BP: (148-179)/() 148/90  Body mass index is 29.89 kg/m².    Objective:  General Appearance:  Comfortable and in no acute distress.    Vital signs: (most recent): Blood pressure 148/90, pulse 66, temperature 97.6 °F (36.4 °C), temperature source Oral, resp. rate 18, height 177.8 cm (70\"), weight 94.5 kg (208 lb 4.8 oz), SpO2 93 %.    Lungs:  Normal effort and normal respiratory rate.    Heart: Normal rate.  Regular rhythm.    Abdomen: Abdomen is soft.  Bowel sounds are normal.   There is no abdominal tenderness.     Extremities: There is no dependent edema or local swelling.    Neurological: Patient is alert and oriented to person, place and time.    Skin:  Warm and dry.              Results Review:       I reviewed the patient's new clinical results.    Results from last 7 days   Lab Units 20  0520 20  0429   WBC 10*3/mm3 5.40 5.98   HEMOGLOBIN g/dL 14.6 14.4   PLATELETS 10*3/mm3 220 223     Results from last 7 days   Lab Units 20  0520 20  0429   SODIUM mmol/L 137 140   POTASSIUM mmol/L 4.1 3.7   CHLORIDE mmol/L 106 107   CO2 mmol/L 21.5* 22.7   BUN mg/dL 10 11   CREATININE mg/dL 0.69* 0.75*   CALCIUM mg/dL 8.9 8.9   GLUCOSE mg/dL 88 144*                 Scheduled Meds:     aspirin 81 mg Oral Daily   atorvastatin 80 mg Oral Nightly   dilTIAZem  mg Oral Daily   metoprolol tartrate 25 mg Oral Q12H   rivaroxaban 20 mg Oral Daily With Dinner   sodium chloride 10 mL Intravenous Q12H     PRN Meds:   •  acetaminophen **OR** acetaminophen **OR** acetaminophen  • "  acetaminophen **OR** acetaminophen  •  aluminum-magnesium hydroxide-simethicone  •  bisacodyl  •  bisacodyl  •  labetalol  •  ondansetron  •  ondansetron **OR** ondansetron  •  sodium chloride  Continuous Infusions:       Assessment/Plan   Active Hospital Problems    Diagnosis  POA   • **Lacunar stroke (CMS/HCC) [I63.81]  Yes   • Paroxysmal atrial fibrillation (CMS/HCC) [I48.0]  Yes   • Hypertension [I10]  Yes      Resolved Hospital Problems   No resolved problems to display.       Assessment & Plan    -He has returned to neurologic baseline.  Now on 81 mg aspirin and Xarelto  -Appreciate assistance from neurology and cardiology  -Echocardiogram unremarkable  -At the time of seeing him this morning his blood pressure was still fairly elevated at 164/103 and would like to see improvement in this prior to releasing him home as this is probably the single greatest risk factor/cause for his lacunar stroke.  Will add back lisinopril to his regimen which was held upon discharge  -Discussed with the patient and family.  They agree with working on better blood pressure control today and if it comes down to more reasonable levels then discharge tomorrow.    Jovanni Benjamin MD  [facility]  03/14/20  1:49 PM

## 2020-03-14 NOTE — PROGRESS NOTES
"DOS: 3/14/2020  NAME: James Snellen   : 1960  PCP: Navi Mosqueda  Patient seen in follow-up today; new to me        Stroke    Subjective: No events overnight; back to his neurological baseline.    Wife and daughter at bedside.     Objective:  Vital signs: BP (!) 164/103 (BP Location: Right arm, Patient Position: Lying) Comment: nurse notified  Pulse 77   Temp 97.6 °F (36.4 °C) (Oral)   Resp 18   Ht 177.8 cm (70\")   Wt 94.5 kg (208 lb 4.8 oz)   SpO2 92%   BMI 29.89 kg/m²       HEENT: Normocephalic, atraumatic   COR: RRR  Resp: Even and unlabored  Extremities: Equal pulses, non distal embolization    Neurological:   MS: AO. Language normal. No neglect. Higher integrative function normal  CN: II-XII normal  Motor: 5/5, normal tone  Reflexes: Toes down going   Sensory: Intact  Coordination: Normal    Laboratory results:  Lab Results   Component Value Date    GLUCOSE 88 2020    CALCIUM 8.9 2020     2020    K 4.1 2020    CO2 21.5 (L) 2020     2020    BUN 10 2020    CREATININE 0.69 (L) 2020    EGFRIFNONA 117 2020    BCR 14.5 2020    ANIONGAP 9.5 2020     Lab Results   Component Value Date    WBC 5.40 2020    HGB 14.6 2020    HCT 43.0 2020    MCV 90.9 2020     2020     Lab Results   Component Value Date    CHOL 204 (H) 2020     Lab Results   Component Value Date    HDL 45 2020     Lab Results   Component Value Date     (H) 2020     Lab Results   Component Value Date    TRIG 73 2020     Lab Results   Component Value Date    TSH 1.560 2020     Lab Results   Component Value Date    NBAJXYJB05 480 2020     Lab Results   Component Value Date    HGBA1C 5.83 (H) 2020     Lab Results   Component Value Date    CHOL 204 (H) 2020     Lab Results   Component Value Date    TRIG 73 2020     Lab Results   Component Value Date    HDL 45 2020     Lab " Results   Component Value Date     (H) 03/13/2020         Review and interpretation of imaging:  Interpretation Summary     · Calculated EF = 68%.  · Left ventricular systolic function is normal.  · Normal valvular structure and function  · Saline test results are negative.        MRI THE BRAIN WITHOUT CONTRAST 03/13/2020     CLINICAL HISTORY: Left-sided numbness began yesterday.     TECHNIQUE: Axial T1, FLAIR, fat-suppressed T2, axial diffusion and  gradient echo T2 and sagittal T1-weighted images were obtained of the  entire head.     There are no prior studies for comparison.     FINDINGS: There is an 11 x 9 mm ovoid area of intense increased signal  intensity on diffusion-weighted images, mild increased signal intensity  on FLAIR and T2-weighted images and signal loss on the ADC maps  indicating true cytotoxic edema from an acute lacunar type infarct that  extends from the lateral right thalamus into the posterior limb of the  right internal capsule. There is scattered tiny foci of T2 high signal  in the cerebral white matter that is consistent with very minimal small  vessel disease. The remainder of the brain parenchyma is normal in  signal intensity. The ventricles are normal in size. I see no mass  effect, no midline shift and no extra-axial fluid collections are  identified on the gradient echo T2 weighted images. No acute or old  blood breakdown products are seen intracranially. There is a tiny amount  of fluid in the posterior left maxillary sinus. The remainder of the  paranasal sinuses and mastoid air cells and middle ear cavities are  clear. Good flow voids are demonstrated within the cerebral vessels and  in the dural venous sinuses.     IMPRESSION:  1. There is a 11 x 9 mm ovoid acute lacunar infarct extending from the  lateral right thalamus into the posterior limb of the right internal  capsule undoubtedly accounting for the patient's acute onset left-sided  numbness that began  yesterday.  2. Very minimal small vessel disease in the cerebral white matter.  3. Tiny amount of fluid in the posterior left maxillary sinus. The  remainder of the MRI of the brain is normal.      The results were communicated to both Dr. Jair Benjamin as well as Dr. Ryder Whitman from Stroke Neurology by telephone 03/13/2020 at 9:00 AM.     This report was finalized on 3/13/2020 10:17 AM by Dr. Jovanni Issa M.D.     CONTRAST-ENHANCED CT ANGIOGRAM OF THE HEAD AND NECK 03/13/2020     CLINICAL HISTORY: Acute onset left arm and leg heaviness, weakness and  numbness.     TECHNIQUE: Spiral CT images were obtained from the base of skull to  vertex both pre and post intravenous contrast. Images were reformatted  and submitted in 3 mm thick axial CT section with brain algorithm. 2 mm  thick sagittal and coronal reconstructions were performed and submitted  in brain algorithm. Additional spiral CT images were obtained from the  top of the aortic arch up through the great vessels of the head and neck  during arterial phase of contrast and images were reformatted and  submitted in 1 mm thick axial, sagittal and coronal CT section.  Additional 3D reconstructions were performed to complete the CT  angiogram of the head and neck     This is correlated to an MRI of the brain immediately prior to the CT  angiogram of the head and neck on 03/13/2020 at 9:00 AM. There are no  additional prior studies for comparison.     FINDINGS:     Head CT: The MRI just prior to the CT demonstrates an 11 x 9 mm ovoid  acute infarct extending from the lateral right thalamus into the  posterior limb of the right internal capsule. It is barely identifiable  on this exam on postcontrast axial images 23 and 24. It is consistent  with an acute lacunar type infarct. The remainder of the brain  parenchyma is normal in attenuation. The ventricles are normal in size.  I see no mass effect and no midline shift and no extra-axial fluid  collections are  identified. There is no evidence of acute intracranial  hemorrhage. There is a small amount of fluid in the posterior left  maxillary sinus. The remainder of the paranasal sinuses and mastoid air  cells and middle ear cavities are clear.     CT ANGIOGRAM OF THE NECK: The nasopharynx, oropharynx, hypopharynx, true  cords and subglottic airway are normal in appearance. The thyroid gland  is normal in appearance. The lung apices are clear. There are some  peripheral blebs and some biapical emphysematous change. The parotid,  , parapharyngeal and submandibular spaces are symmetric and  are normal in appearance. No pathologic lymphadenopathy is seen in the  neck. Mild cervical spondylosis is present. There are posterior endplate  spurs and mild canal narrowing at C2-3, mild left foraminal narrowing at  C3-4. There is direct origin of the left vertebral artery off the aortic  arch which is a normal anatomic variation. The left vertebral artery is  widely patent from its origin to the vertebrobasilar junction without  stenosis. The left subclavian artery origin is normal in appearance. No  stenosis is seen in left subclavian artery. The left common carotid  origin is normal in appearance and no stenosis is seen in the left  common carotid artery. Its bifurcation into the left internal and  external carotid arteries is within normal limits. There is  circumferential mixed calcified and noncalcified plaque that involves  the proximal left internal carotid artery 14 mm distal to the left  common carotid bifurcation, results in maximally a 10% to 20% stenosis  of the left internal carotid artery using the NASCET criteria. No  additional stenosis is seen in the left internal carotid artery. The  brachycephalic artery origin is normal in appearance. No stenosis is  seen in the brachycephalic artery. Its bifurcation into the right  subclavian and common carotid arteries is normal in appearance and no  stenosis is seen  in the right subclavian artery. The right vertebral  artery origin is normal in appearance and no stenosis is seen in the  right vertebral artery from its origin to the vertebrobasilar junction.  The right common carotid origin is normal in appearance and no stenosis  is seen in the right common carotid artery. There is mixed calcified and  noncalcified plaque extending from the common carotid bifurcation into  the origin of the right internal carotid artery but there is no stenosis  in the right internal carotid artery using NASCET criteria.     CT ANGIOGRAM HEAD: CT angiographic images through the head demonstrate a  normal appearance of the intracranial segments of the distal vertebral  arteries, normal appearance of the basilar artery and basilar tip as  well as the superior cerebellar arteries bilaterally. There is a focal  mild to moderate stenosis of the distal P2 segment of the left posterior  cerebral artery likely on the basis of intracranial atherosclerotic  disease. The cavernous and supracavernous segments of the internal  carotid arteries are within normal limits. The anterior and middle  cerebral arteries are within normal limits. The anterior communicating  artery origin and middle cerebral artery trifurcations are within normal  limits.     IMPRESSION:     1. There is an 11 x 9 mm ovoid acute lacunar type infarct extending from  the lateral right thalamus into the posterior limb of the right internal  capsule that is much better demonstrated on the MRI of the brain just  prior to the CT angiogram of the head and neck on 03/13/2020 at 9:12 AM.     2. There is a atherosclerotic plaque that very mildly narrows the  proximal left internal carotid artery by 10% to 20% using the NASCET  criteria, 14 mm distal to the left common carotid bifurcation. No  additional stenosis is seen in the great vessels of the neck.     3. CT angiogram images through the head demonstrate a focal  mild-to-moderate stenosis  distal P2 segment of the left posterior  cerebral artery likely on the basis of intracranial atherosclerotic  disease. The remainder of the CT angiogram of the head and neck is  normal.     Radiation dose reduction techniques were utilized, including automated  exposure control and exposure modulation based on body size.     This report was finalized on 3/13/2020 12:52 PM by Dr. Jovanni Issa M.D.       Impression: This is a 60-year-old male with history of paroxysmal atrial fibrillation (on no anticoagulation prior to arrival), hypertension and former tobacco abuse who presented to outside facility 3/13 due to complaint of left-sided heaviness/numbness upon waking up from a nap.  tPA was indicated as patient was out of the time treatment window.  Initial CT of the head was negative.  He subsequently received 300 mg Plavix load and was transferred to [facility] for further CVA evaluation.  On arrival here NIHSS was 0; symptoms resolved.  CTA of the head and neck revealed acute lacunar infarct extending into the lateral right thalamus and right internal capsule.  Atherosclerotic plaque noted left ICA (10 to 20%).  Mild to moderate stenosis noted distal P2 of left PCA thought to be secondary to intracranial atherosclerotic disease.  MRI of the brain revealed acute lacunar infarct right lateral thalamus extending into right internal capsule.  Evidence of very minimal small vessel disease.  Incidental small amount of fluid noted in left maxillary sinus.  TTE showed EF 68%.  LV/LA normal: No evidence of PFO/saline test negative.  Left atrial volume index 27.  Etiology of CVA thought to be secondary to apparent small vessel thrombus due to uncontrolled risk factors; hypertension and hyperlipidemia.  Etiology low suspicion for cardioembolic source due to location although patient does have history of paroxysmal atrial fibrillation; no A. fib seen this admission.  Cardiology consulted: Metoprolol initiated and  Benji; Xarelto therefore Plavix discontinued per their recommendation; low-dose aspirin remains.Labs: TSH 1.560, B12 480, A1c 5.83,     Neurologically,stabe at baseline.  Recommendations below.PT/OT/ST. CCP to assist with discharge planning. Call RRT for any acute neurological changes and/or concerns. No further neurology workup indicated. We will sign off and see again upon request.        Plan:  ASA 81 mg daily (on prior to arrival)  Xarelto 20 mg daily  Lipitor 80 mg daily ()  Neurochecks  BP control  Stroke Education  ENMANUEL/SCDs  PT/OT/ST  Follow-up w/ me in 3 months; sooner if needed     Case reviewed w/ Dr. Whitman and he agrees with plan above.     GRADY Laureano Dr. discussed case w/ Dr. Dorsey who is reconsidering LT anticoagulation; may recommend DAPT and LT holter given not AF noted this admission. We agree w/ this plan.     GRADY Laureano   2:35 PM

## 2020-03-14 NOTE — PLAN OF CARE
Problem: Patient Care Overview  Goal: Plan of Care Review  Flowsheets  Taken 3/14/2020 0515 by Vika Gillespie RN  Progress: improving  Outcome Summary: No significant overnight events. Remains hypertensive. First dose metoprolol given with education provided. Possible d/c today?  Taken 3/13/2020 1800 by Verna Ordonez RN  Plan of Care Reviewed With: patient;spouse     Problem: Stroke (Ischemic) (Adult)  Goal: Signs and Symptoms of Listed Potential Problems Will be Absent, Minimized or Managed (Stroke)  Flowsheets (Taken 3/14/2020 0515)  Problems Assessed (Stroke (Ischemic)): all  Problems Assessed (Stroke (Ischemic)): acute neurologic deterioration; situational response

## 2020-03-14 NOTE — PROGRESS NOTES
[facility]  Progress note: 3/14/2020    Patient Identification:  Name:James Snellen  Age:60 y.o.  Sex: male  :  1960  MRN: [id number]           CC:  Paroxysmal atrial fibrillation    Interval history:  Now on Xarelto and aspirin.  Echo with normal systolic function no significant valvular heart disease and negative saline injection.  Blood pressure elevated 164/103mmHg and lisinopril restarted.  Blood pressure lower now.  Still with some left lower leg paresthesias    Vital Signs:   Temp:  [97.6 °F (36.4 °C)-98.5 °F (36.9 °C)] 97.6 °F (36.4 °C)  Heart Rate:  [66-86] 66  Resp:  [18] 18  BP: (148-179)/() 148/90    Intake/Output Summary (Last 24 hours) at 3/14/2020 1331  Last data filed at 3/13/2020 1800  Gross per 24 hour   Intake 695 ml   Output --   Net 695 ml       Physical Examination:    General Appearance No acute distress   Neck No adenopathy, supple, trachea midline, no thyromegaly, no carotid bruit, no JVD   Lungs Clear to auscultation,respirations regular, even and unlabored   Heart Regular rhythm and normal rate, normal S1 and S2, no murmur, no gallop, no rub, no click   Chest wall No abnormalities observed   Abdomen Normal bowel sounds, no masses, no hepatomegaly, soft   Extremities Moves all extremities well, no edema, no cyanosis, no redness   Neurological Alert and oriented x 3     Lab Review:  Personally reviewed the labs, radiology imaging and other cardiac procedures. Results from last 7 days   Lab Units 209   SODIUM mmol/L 137 140   POTASSIUM mmol/L 4.1 3.7   CHLORIDE mmol/L 106 107   CO2 mmol/L 21.5* 22.7   BUN mg/dL 10 11   CREATININE mg/dL 0.69* 0.75*   CALCIUM mg/dL 8.9 8.9   BILIRUBIN mg/dL  --  0.3   ALK PHOS U/L  --  110   ALT (SGPT) U/L  --  18   AST (SGOT) U/L  --  17   GLUCOSE mg/dL 88 144*       Results from last 7 days   Lab Units 209   WBC 10*3/mm3 5.40 5.98   HEMOGLOBIN g/dL 14.6 14.4   HEMATOCRIT % 43.0 41.3    PLATELETS 10*3/mm3 220 223     Medication Review:   Meds reviewed  Scheduled Meds:  aspirin 81 mg Oral Daily   atorvastatin 80 mg Oral Nightly   dilTIAZem  mg Oral Daily   metoprolol tartrate 25 mg Oral Q12H   rivaroxaban 20 mg Oral Daily With Dinner   sodium chloride 10 mL Intravenous Q12H     I personally viewed and interpreted the patient's EKG/Telemetry data    Assessment and Plan  1.  Acute lacunar infarct right thalamus with minimal small vessel disease.  Discussed with Dr. Whitman.  This seems to be a lacunar infarct and therefore likely not embolic.  We will therefore discontinue Xarelto and use aspirin and Plavix.  Place a 30-day ZIO-AT monitor at dismissal.  Patient will follow-up with Dr. Erwin as an outpatient per patient request.  2.  Hypertension.  Back on lisinopril.  3.  History of paroxysmal atrial fibrillation  4.  Dyslipidemia      Mini Dorsey  3/14/29134:31 PM  25min spent in reviewing records, discussion and examination of the patient and discussion with other members of the patient's medical team.     Dictated utilizing Dragon dictation

## 2020-03-14 NOTE — THERAPY TREATMENT NOTE
Patient Name: James Snellen  : 1960    MRN: [id number]                              Today's Date: 3/14/2020       Admit Date: 3/13/2020    Visit Dx: No diagnosis found.  Patient Active Problem List   Diagnosis   • Paroxysmal atrial fibrillation (CMS/HCC)   • Hypertension   • Transient ischemic attack (TIA)   • CVA (cerebral vascular accident) (CMS/HCC)     Past Medical History:   Diagnosis Date   • Afib (CMS/HCC)    • Hypertension      History reviewed. No pertinent surgical history.  General Information     Row Name 20 1045          PT Evaluation Time/Intention    Document Type  therapy note (daily note)  -DB     Mode of Treatment  physical therapy;individual therapy  -DB     Row Name 20 1045          General Information    Patient Profile Reviewed?  yes  -DB     Prior Level of Function  independent:  -DB     Existing Precautions/Restrictions  fall  -DB     Barriers to Rehab  none identified  -DB     Row Name 20 1045          Cognitive Assessment/Intervention- PT/OT    Orientation Status (Cognition)  oriented x 4  -DB     Row Name 20 1045          Safety Issues, Functional Mobility    Impairments Affecting Function (Mobility)  balance  -DB       User Key  (r) = Recorded By, (t) = Taken By, (c) = Cosigned By    Initials Name Provider Type    DB Evelia Celis, PT Physical Therapist        Mobility     Row Name 20 1045          Bed Mobility Assessment/Treatment    Bed Mobility Assessment/Treatment  bed mobility (all) activities  -DB     [place] Level (Bed Mobility)  independent  -DB     Row Name 20 1045          Sit-Stand Transfer    Sit-Stand [place] (Transfers)  independent  -DB     Row Name 20 1045          Gait/Stairs Assessment/Training    [place] Level (Gait)  supervision  -DB     Assistive Device (Gait)  -- pt pushed IV pole   -DB     Distance in Feet (Gait)  450'  -DB     Pattern (Gait)  step-through  -DB       User Key  (r) = Recorded By, (t)  = Taken By, (c) = Cosigned By    Initials Name Provider Type    DB Evelia Celis PT Physical Therapist        Obj/Interventions     Row Name 03/14/20 1047          Static Sitting Balance    Level of [place] (Unsupported Sitting, Static Balance)  independent  -DB     Sitting Position (Unsupported Sitting, Static Balance)  sitting on edge of bed  -DB     Row Name 03/14/20 1047          Dynamic Sitting Balance    Level of [place], Reaches Outside Midline (Sitting, Dynamic Balance)  independent  -DB     Sitting Position, Reaches Outside Midline (Sitting, Dynamic Balance)  sitting on edge of bed  -DB     Row Name 03/14/20 1047          Static Standing Balance    Level of [place] (Supported Standing, Static Balance)  independent  -DB       User Key  (r) = Recorded By, (t) = Taken By, (c) = Cosigned By    Initials Name Provider Type    Evelia Baker PT Physical Therapist        Goals/Plan    No documentation.       Clinical Impression     Row Name 03/14/20 1048          Pain Scale: Numbers Pre/Post-Treatment    Pain Scale: Numbers, Pretreatment  0/10 - no pain  -DB     Pain Scale: Numbers, Post-Treatment  0/10 - no pain  -DB     Row Name 03/14/20 1048          Plan of Care Review    Plan of Care Reviewed With  patient;spouse  -DB     Progress  improving  -DB     Outcome Summary  Pt agreeable to walk with therapy this date. Pt able to independently perform bed mobility. Bed alarm was not on upon entering the room. Pt pushed his IV pole and ambulated 450' around the RN station with SBA/supervision. Pt reported he only feels weak in his left hip, otherwise feels like he is at his baseline. Pt would benefit from OP PT to work on higher level balance activities, but is otherwise safe to return home with family if medially stable.   -DB     Row Name 03/14/20 1048          Physical Therapy Clinical Impression    Criteria for Skilled Interventions Met (PT Clinical Impression)  yes;treatment indicated  -DB      Row Name 03/14/20 1048          Vital Signs    O2 Delivery Pre Treatment  room air  -DB     O2 Delivery Intra Treatment  room air  -DB     Post SpO2 (%)  95  -DB     O2 Delivery Post Treatment  room air  -DB     Pre Patient Position  Supine  -DB     Post Patient Position  Supine  -DB     Row Name 03/14/20 1048          Positioning and Restraints    Pre-Treatment Position  in bed  -DB     Post Treatment Position  bed  -DB     In Bed  call light within reach;notified nsg;with family/caregiver  -DB       User Key  (r) = Recorded By, (t) = Taken By, (c) = Cosigned By    Initials Name Provider Type    Evelia Baker, PT Physical Therapist        Outcome Measures     Row Name 03/14/20 1052          How much help from another person do you currently need...    Turning from your back to your side while in flat bed without using bedrails?  4  -DB     Moving from lying on back to sitting on the side of a flat bed without bedrails?  4  -DB     Moving to and from a bed to a chair (including a wheelchair)?  4  -DB     Standing up from a chair using your arms (e.g., wheelchair, bedside chair)?  4  -DB     Climbing 3-5 steps with a railing?  3  -DB     To walk in hospital room?  4  -DB     AM-PAC 6 Clicks Score (PT)  23  -DB       User Key  (r) = Recorded By, (t) = Taken By, (c) = Cosigned By    Initials Name Provider Type    Evelia Baker, PT Physical Therapist          PT Recommendation and Plan     Outcome Summary/Treatment Plan (PT)  Anticipated Discharge Disposition (PT): home with OP services  Plan of Care Reviewed With: patient, spouse  Progress: improving  Outcome Summary: Pt agreeable to walk with therapy this date. Pt able to independently perform bed mobility. Bed alarm was not on upon entering the room. Pt pushed his IV pole and ambulated 450' around the RN station with SBA/supervision. Pt reported he only feels weak in his left hip, otherwise feels like he is at his baseline. Pt would benefit from OP PT  to work on higher level balance activities, but is otherwise safe to return home with family if medially stable.      Time Calculation:   PT Charges     Row Name 03/14/20 1054             Time Calculation    Start Time  1035  -DB      Stop Time  1045  -DB      Time Calculation (min)  10 min  -DB      PT Received On  03/14/20  -DB      PT - Next Appointment  03/16/20  -DB        User Key  (r) = Recorded By, (t) = Taken By, (c) = Cosigned By    Initials Name Provider Type    DB Evelia Celis, ACE Physical Therapist        Therapy Charges for Today     Code Description Service Date Service Provider Modifiers Qty    88488059287 HC PT THER PROC EA 15 MIN 3/14/2020 Evelia Celis PT GP 1          PT G-Codes  Outcome Measure Options: AM-PAC 6 Clicks Basic Mobility (PT)  AM-PAC 6 Clicks Score (PT): 23    Evelia Celis PT  3/14/2020

## 2020-03-15 ENCOUNTER — APPOINTMENT (OUTPATIENT)
Dept: CARDIOLOGY | Facility: HOSPITAL | Age: 60
End: 2020-03-15

## 2020-03-15 VITALS
WEIGHT: 208.3 LBS | SYSTOLIC BLOOD PRESSURE: 137 MMHG | HEIGHT: 70 IN | HEART RATE: 57 BPM | DIASTOLIC BLOOD PRESSURE: 91 MMHG | TEMPERATURE: 98.4 F | RESPIRATION RATE: 18 BRPM | OXYGEN SATURATION: 95 % | BODY MASS INDEX: 29.82 KG/M2

## 2020-03-15 PROCEDURE — 99232 SBSQ HOSP IP/OBS MODERATE 35: CPT | Performed by: NURSE PRACTITIONER

## 2020-03-15 PROCEDURE — 0296T HC EXT ECG > 48HR TO 21 DAY RCRD W/CONECT INTL RCRD: CPT

## 2020-03-15 RX ORDER — CLOPIDOGREL BISULFATE 75 MG/1
75 TABLET ORAL DAILY
Qty: 30 TABLET | Refills: 0 | Status: SHIPPED | OUTPATIENT
Start: 2020-03-16 | End: 2020-04-09

## 2020-03-15 RX ORDER — ATORVASTATIN CALCIUM 80 MG/1
80 TABLET, FILM COATED ORAL NIGHTLY
Qty: 30 TABLET | Refills: 0 | Status: SHIPPED | OUTPATIENT
Start: 2020-03-15 | End: 2020-04-14

## 2020-03-15 RX ADMIN — LISINOPRIL 40 MG: 40 TABLET ORAL at 08:06

## 2020-03-15 RX ADMIN — CLOPIDOGREL 75 MG: 75 TABLET, FILM COATED ORAL at 08:06

## 2020-03-15 RX ADMIN — ASPIRIN 81 MG: 81 TABLET, COATED ORAL at 08:06

## 2020-03-15 RX ADMIN — DILTIAZEM HYDROCHLORIDE 240 MG: 240 CAPSULE, COATED, EXTENDED RELEASE ORAL at 08:06

## 2020-03-15 RX ADMIN — SODIUM CHLORIDE, PRESERVATIVE FREE 10 ML: 5 INJECTION INTRAVENOUS at 08:06

## 2020-03-15 RX ADMIN — METOPROLOL TARTRATE 25 MG: 25 TABLET ORAL at 08:05

## 2020-03-15 NOTE — PROGRESS NOTES
"DOS: 3/15/2020  NAME: James Snellen   : 1960  PCP: Navi Mosqueda      Stroke    Subjective: No events overnight; back to his neurological baseline. He denies any new complaints and/or concerns.     Wife at bedside.     Objective:  Vital signs: /92 (BP Location: Right arm, Patient Position: Lying)   Pulse 69   Temp 98.1 °F (36.7 °C) (Oral)   Resp 18   Ht 177.8 cm (70\")   Wt 94.5 kg (208 lb 4.8 oz)   SpO2 95%   BMI 29.89 kg/m²       HEENT: Normocephalic, atraumatic   COR: RRR  Resp: Even and unlabored  Extremities: Equal pulses, non distal embolization    Neurological:   MS: AO. Language normal. No neglect. 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)   Motor: 5/5, normal tone  Reflexes: Toes down going   Sensory: Intact  Coordination: Normal    Laboratory results:  Lab Results   Component Value Date    GLUCOSE 88 2020    CALCIUM 8.9 2020     2020    K 4.1 2020    CO2 21.5 (L) 2020     2020    BUN 10 2020    CREATININE 0.69 (L) 2020    EGFRIFNONA 117 2020    BCR 14.5 2020    ANIONGAP 9.5 2020     Lab Results   Component Value Date    WBC 5.40 2020    HGB 14.6 2020    HCT 43.0 2020    MCV 90.9 2020     2020     Lab Results   Component Value Date    CHOL 204 (H) 2020     Lab Results   Component Value Date    HDL 45 2020     Lab Results   Component Value Date     (H) 2020     Lab Results   Component Value Date    TRIG 73 2020     Lab Results   Component Value Date    TSH 1.560 2020     Lab Results   Component Value Date    IEHNRZPL35 480 2020     Lab Results   Component Value Date    HGBA1C 5.83 (H) 2020     Lab Results   Component Value Date    CHOL 204 (H) 2020     Lab Results   Component Value Date    TRIG 73 2020     Lab Results   Component Value Date    HDL 45 2020     Lab " Results   Component Value Date     (H) 03/13/2020         Review and interpretation of imaging:  Interpretation Summary     · Calculated EF = 68%.  · Left ventricular systolic function is normal.  · Normal valvular structure and function  · Saline test results are negative.        MRI THE BRAIN WITHOUT CONTRAST 03/13/2020     CLINICAL HISTORY: Left-sided numbness began yesterday.     TECHNIQUE: Axial T1, FLAIR, fat-suppressed T2, axial diffusion and  gradient echo T2 and sagittal T1-weighted images were obtained of the  entire head.     There are no prior studies for comparison.     FINDINGS: There is an 11 x 9 mm ovoid area of intense increased signal  intensity on diffusion-weighted images, mild increased signal intensity  on FLAIR and T2-weighted images and signal loss on the ADC maps  indicating true cytotoxic edema from an acute lacunar type infarct that  extends from the lateral right thalamus into the posterior limb of the  right internal capsule. There is scattered tiny foci of T2 high signal  in the cerebral white matter that is consistent with very minimal small  vessel disease. The remainder of the brain parenchyma is normal in  signal intensity. The ventricles are normal in size. I see no mass  effect, no midline shift and no extra-axial fluid collections are  identified on the gradient echo T2 weighted images. No acute or old  blood breakdown products are seen intracranially. There is a tiny amount  of fluid in the posterior left maxillary sinus. The remainder of the  paranasal sinuses and mastoid air cells and middle ear cavities are  clear. Good flow voids are demonstrated within the cerebral vessels and  in the dural venous sinuses.     IMPRESSION:  1. There is a 11 x 9 mm ovoid acute lacunar infarct extending from the  lateral right thalamus into the posterior limb of the right internal  capsule undoubtedly accounting for the patient's acute onset left-sided  numbness that began  yesterday.  2. Very minimal small vessel disease in the cerebral white matter.  3. Tiny amount of fluid in the posterior left maxillary sinus. The  remainder of the MRI of the brain is normal.      The results were communicated to both Dr. Jair Benjamin as well as Dr. Ryder Whitman from Stroke Neurology by telephone 03/13/2020 at 9:00 AM.     This report was finalized on 3/13/2020 10:17 AM by Dr. Jovanni Issa M.D.     CONTRAST-ENHANCED CT ANGIOGRAM OF THE HEAD AND NECK 03/13/2020     CLINICAL HISTORY: Acute onset left arm and leg heaviness, weakness and  numbness.     TECHNIQUE: Spiral CT images were obtained from the base of skull to  vertex both pre and post intravenous contrast. Images were reformatted  and submitted in 3 mm thick axial CT section with brain algorithm. 2 mm  thick sagittal and coronal reconstructions were performed and submitted  in brain algorithm. Additional spiral CT images were obtained from the  top of the aortic arch up through the great vessels of the head and neck  during arterial phase of contrast and images were reformatted and  submitted in 1 mm thick axial, sagittal and coronal CT section.  Additional 3D reconstructions were performed to complete the CT  angiogram of the head and neck     This is correlated to an MRI of the brain immediately prior to the CT  angiogram of the head and neck on 03/13/2020 at 9:00 AM. There are no  additional prior studies for comparison.     FINDINGS:     Head CT: The MRI just prior to the CT demonstrates an 11 x 9 mm ovoid  acute infarct extending from the lateral right thalamus into the  posterior limb of the right internal capsule. It is barely identifiable  on this exam on postcontrast axial images 23 and 24. It is consistent  with an acute lacunar type infarct. The remainder of the brain  parenchyma is normal in attenuation. The ventricles are normal in size.  I see no mass effect and no midline shift and no extra-axial fluid  collections are  identified. There is no evidence of acute intracranial  hemorrhage. There is a small amount of fluid in the posterior left  maxillary sinus. The remainder of the paranasal sinuses and mastoid air  cells and middle ear cavities are clear.     CT ANGIOGRAM OF THE NECK: The nasopharynx, oropharynx, hypopharynx, true  cords and subglottic airway are normal in appearance. The thyroid gland  is normal in appearance. The lung apices are clear. There are some  peripheral blebs and some biapical emphysematous change. The parotid,  , parapharyngeal and submandibular spaces are symmetric and  are normal in appearance. No pathologic lymphadenopathy is seen in the  neck. Mild cervical spondylosis is present. There are posterior endplate  spurs and mild canal narrowing at C2-3, mild left foraminal narrowing at  C3-4. There is direct origin of the left vertebral artery off the aortic  arch which is a normal anatomic variation. The left vertebral artery is  widely patent from its origin to the vertebrobasilar junction without  stenosis. The left subclavian artery origin is normal in appearance. No  stenosis is seen in left subclavian artery. The left common carotid  origin is normal in appearance and no stenosis is seen in the left  common carotid artery. Its bifurcation into the left internal and  external carotid arteries is within normal limits. There is  circumferential mixed calcified and noncalcified plaque that involves  the proximal left internal carotid artery 14 mm distal to the left  common carotid bifurcation, results in maximally a 10% to 20% stenosis  of the left internal carotid artery using the NASCET criteria. No  additional stenosis is seen in the left internal carotid artery. The  brachycephalic artery origin is normal in appearance. No stenosis is  seen in the brachycephalic artery. Its bifurcation into the right  subclavian and common carotid arteries is normal in appearance and no  stenosis is seen  in the right subclavian artery. The right vertebral  artery origin is normal in appearance and no stenosis is seen in the  right vertebral artery from its origin to the vertebrobasilar junction.  The right common carotid origin is normal in appearance and no stenosis  is seen in the right common carotid artery. There is mixed calcified and  noncalcified plaque extending from the common carotid bifurcation into  the origin of the right internal carotid artery but there is no stenosis  in the right internal carotid artery using NASCET criteria.     CT ANGIOGRAM HEAD: CT angiographic images through the head demonstrate a  normal appearance of the intracranial segments of the distal vertebral  arteries, normal appearance of the basilar artery and basilar tip as  well as the superior cerebellar arteries bilaterally. There is a focal  mild to moderate stenosis of the distal P2 segment of the left posterior  cerebral artery likely on the basis of intracranial atherosclerotic  disease. The cavernous and supracavernous segments of the internal  carotid arteries are within normal limits. The anterior and middle  cerebral arteries are within normal limits. The anterior communicating  artery origin and middle cerebral artery trifurcations are within normal  limits.     IMPRESSION:     1. There is an 11 x 9 mm ovoid acute lacunar type infarct extending from  the lateral right thalamus into the posterior limb of the right internal  capsule that is much better demonstrated on the MRI of the brain just  prior to the CT angiogram of the head and neck on 03/13/2020 at 9:12 AM.     2. There is a atherosclerotic plaque that very mildly narrows the  proximal left internal carotid artery by 10% to 20% using the NASCET  criteria, 14 mm distal to the left common carotid bifurcation. No  additional stenosis is seen in the great vessels of the neck.     3. CT angiogram images through the head demonstrate a focal  mild-to-moderate stenosis  distal P2 segment of the left posterior  cerebral artery likely on the basis of intracranial atherosclerotic  disease. The remainder of the CT angiogram of the head and neck is  normal.     Radiation dose reduction techniques were utilized, including automated  exposure control and exposure modulation based on body size.     This report was finalized on 3/13/2020 12:52 PM by Dr. Jovanni Issa M.D.       Impression: This is a 60-year-old male with history of paroxysmal atrial fibrillation (on no anticoagulation prior to arrival), hypertension and former tobacco abuse who presented to outside facility 3/13 due to complaint of left-sided heaviness/numbness upon waking up from a nap.  tPA was indicated as patient was out of the time treatment window.  Initial CT of the head was negative.  He subsequently received 300 mg Plavix load and was transferred to [facility] for further CVA evaluation.  On arrival here NIHSS was 0; symptoms resolved.  CTA of the head and neck revealed acute lacunar infarct extending into the lateral right thalamus and right internal capsule.  Atherosclerotic plaque noted left ICA (10 to 20%).  Mild to moderate stenosis noted distal P2 of left PCA thought to be secondary to intracranial atherosclerotic disease.  MRI of the brain revealed acute lacunar infarct right lateral thalamus extending into right internal capsule.  Evidence of very minimal small vessel disease.  Incidental small amount of fluid noted in left maxillary sinus.  TTE showed EF 68%.  LV/LA normal: No evidence of PFO/saline test negative.  Left atrial volume index 27.  Etiology of CVA thought to be secondary to apparent small vessel thrombus due to uncontrolled risk factors; hypertension and hyperlipidemia.  Etiology low suspicion for cardioembolic source due to location although patient does have history of paroxysmal atrial fibrillation; no A. fib seen this admission.  Cardiology consulted: Metoprolol initiated and  Benji; Xarelto therefore Plavix discontinued per their recommendation; low-dose aspirin remains.Labs: TSH 1.560, B12 480, A1c 5.83,     Neurologically,stabe at baseline. Discussed new plan to d/c Xalreto and use DAPT and LT holter; stop Xalreto. Other recommendations below.PT/OT/ST. CCP to assist with discharge planning. Call RRT for any acute neurological changes and/or concerns. No further neurology workup indicated. We will sign off and see again upon request.        Plan:  ASA 81 mg daily (on prior to arrival)  Plavix 75mg daily   Xaltero stopped per Cardiology; LT Holter planned   Lipitor 80 mg daily ()  Neurochecks  BP control  Stroke Education  ENMANUEL/SCDs  PT/OT/ST  Follow-up w/ me in 3 months; sooner if needed     Case reviewed w/ Dr. Whitman and he agrees with plan above.     Lynnette Roman APRN

## 2020-03-15 NOTE — NURSING NOTE
Acute rehab referral received via stroke order set. Patient noted to be at a high functional level. No acute rehab needs at this time. Will sign off. Please reconsult if there is any change in status.

## 2020-03-15 NOTE — PLAN OF CARE
Problem: Patient Care Overview  Goal: Plan of Care Review  Outcome: Ongoing (interventions implemented as appropriate)  Flowsheets  Taken 3/14/2020 1048 by Evelia Celis PT  Progress: improving  Taken 3/14/2020 1435 by Bianca Tracy, RN  Plan of Care Reviewed With: patient;family  Taken 3/15/2020 0418 by Vika Gillespie, RN  Outcome Summary: SBP in the 160s. Possible d/c today. Other than hypertension, VSS. NIH 0.  Goal: Individualization and Mutuality  Outcome: Ongoing (interventions implemented as appropriate)  Goal: Discharge Needs Assessment  Outcome: Ongoing (interventions implemented as appropriate)  Goal: Interprofessional Rounds/Family Conf  Outcome: Ongoing (interventions implemented as appropriate)     Problem: Stroke (Ischemic) (Adult)  Goal: Signs and Symptoms of Listed Potential Problems Will be Absent, Minimized or Managed (Stroke)  Outcome: Ongoing (interventions implemented as appropriate)  Flowsheets (Taken 3/14/2020 6691)  Problems Assessed (Stroke (Ischemic)): all  Problems Assessed (Stroke (Ischemic)): acute neurologic deterioration;situational response

## 2020-03-15 NOTE — DISCHARGE SUMMARY
Date of Admission: 3/13/2020  Date of Discharge:  3/15/2020  Primary Care Physician: Navi Mosqueda     Discharge Diagnosis:  Active Hospital Problems    Diagnosis  POA   • **Lacunar stroke (CMS/HCC) [I63.81]  Yes   • Paroxysmal atrial fibrillation (CMS/HCC) [I48.0]  Yes   • Hypertension [I10]  Yes      Resolved Hospital Problems   No resolved problems to display.       DETAILS OF HOSPITAL STAY     Pertinent Test Results and Procedures Performed  Brain MRI:  1. There is a 11 x 9 mm ovoid acute lacunar infarct extending from the  lateral right thalamus into the posterior limb of the right internal  capsule undoubtedly accounting for the patient's acute onset left-sided  numbness that began yesterday.  2. Very minimal small vessel disease in the cerebral white matter.  3. Tiny amount of fluid in the posterior left maxillary sinus. The  remainder of the MRI of the brain is normal.     CT angiogram of the head and neck:  1. There is an 11 x 9 mm ovoid acute lacunar type infarct extending from  the lateral right thalamus into the posterior limb of the right internal  capsule that is much better demonstrated on the MRI of the brain just  prior to the CT angiogram of the head and neck on 03/13/2020 at 9:12 AM.     2. There is a atherosclerotic plaque that very mildly narrows the  proximal left internal carotid artery by 10% to 20% using the NASCET  criteria, 14 mm distal to the left common carotid bifurcation. No  additional stenosis is seen in the great vessels of the neck.     3. CT angiogram images through the head demonstrate a focal  mild-to-moderate stenosis distal P2 segment of the left posterior  cerebral artery likely on the basis of intracranial atherosclerotic  disease. The remainder of the CT angiogram of the head and neck is  normal.    2D echocardiogram:  · Calculated EF = 68%.  · Left ventricular systolic function is normal.  · Normal valvular structure and function  · Saline test results are  negative.    Hospital Course  This is a very pleasant 60-year-old male who presented to the emergency room with complaints of left-sided numbness.  Please see H&P for full details admission.  He was found to have a acute lacunar infarct as described above.  He was evaluated by neurology who placed him on aspirin and Plavix.  His blood pressure was significantly elevated.  He does have a history of paroxysmal atrial fibrillation.  Cardiology was consulted.  They briefly considered full dose anticoagulation but ultimately opted for a ZIO patch upon discharge and close outpatient follow-up while he remains on aspirin and Plavix for the time being.  He was placed on metoprolol in addition to his Cardizem and lisinopril from the outpatient setting.  His blood pressure is improved and he will need continued management of this on a short-term basis.  His left-sided numbness has completely resolved and he has regained his functional baseline.  He will be discharged home today with follow-up in the cardiology office in 1 month and neurology in 3 months.  I have asked him to see his primary care physician in 1 week.    Physical Exam at Discharge:  General: No acute distress, AAOx3  HEENT: EOMI, PERRL  Cardiovascular: +s1 and s2, RRR  Lungs: No rhonchi or wheezing  Abdomen: soft, nontender    Consults:   Consults     Date and Time Order Name Status Description    3/13/2020 1241 Inpatient Cardiology Consult Completed     3/13/2020 0101 Inpatient Neurology Consult Stroke Completed             Condition on Discharge: Stable, improved    Discharge Disposition  Home or Self Care    Discharge Medications     Discharge Medications      New Medications      Instructions Start Date   atorvastatin 80 MG tablet  Commonly known as:  LIPITOR   80 mg, Oral, Nightly      clopidogrel 75 MG tablet  Commonly known as:  PLAVIX   75 mg, Oral, Daily   Start Date:  March 16, 2020     metoprolol tartrate 25 MG tablet  Commonly known as:  LOPRESSOR    25 mg, Oral, Every 12 Hours Scheduled         Continue These Medications      Instructions Start Date   aspirin 81 MG chewable tablet   81 mg, Oral, Daily      dilTIAZem  MG 24 hr capsule  Commonly known as:  CARDIZEM CD   240 mg, Oral, Daily      lisinopril 40 MG tablet  Commonly known as:  PRINIVIL,ZESTRIL   40 mg, Oral, Daily         Stop These Medications    cyclobenzaprine 10 MG tablet  Commonly known as:  FLEXERIL     ibuprofen 800 MG tablet  Commonly known as:  ADVIL,MOTRIN            Discharge Diet:   Diet Instructions     Diet: Regular, Cardiac      Discharge Diet:   Regular  Cardiac             Activity at Discharge:   Activity Instructions     Activity as Tolerated            Follow-up Appointments  No future appointments.  Additional Instructions for the Follow-ups that You Need to Schedule     Discharge Follow-up with PCP   As directed       Currently Documented PCP:    Navi Mosqueda    PCP Phone Number:    [phone]     Follow Up Details:  1 week         Discharge Follow-up with Specialty: [facility] in 3 months   As directed      Specialty:  [facility] stroke [facility] in 3 months         Discharge Follow-up with Specified Provider: Dr. Erwin in 4 weeks   As directed      To:  Dr. Erwin in 4 weeks             I have examined and discussed discharge planning with the patient today.     Jovanni Benjamin MD  03/15/20  12:22 PM    Time: Discharge greater than 30 min

## 2020-03-15 NOTE — PLAN OF CARE
Problem: Patient Care Overview  Goal: Plan of Care Review  Outcome: Ongoing (interventions implemented as appropriate)  Flowsheets  Taken 3/14/2020 1048 by Evelia Celis PT  Progress: improving  Taken 3/14/2020 1435 by Bianca Tracy RN  Plan of Care Reviewed With: patient;family  Taken 3/15/2020 0418 by Vika Gillespie RN  Outcome Summary: SBP in the 160s. Possible d/c today. Other than hypertension, VSS. NIH 0.  Goal: Individualization and Mutuality  Outcome: Ongoing (interventions implemented as appropriate)  Goal: Discharge Needs Assessment  Outcome: Ongoing (interventions implemented as appropriate)  Goal: Interprofessional Rounds/Family Conf  Outcome: Ongoing (interventions implemented as appropriate)     Problem: Stroke (Ischemic) (Adult)  Goal: Signs and Symptoms of Listed Potential Problems Will be Absent, Minimized or Managed (Stroke)  3/15/2020 0420 by Vika Gillespie, RN  Flowsheets (Taken 3/14/2020 0515)  Problems Assessed (Stroke (Ischemic)): all  Problems Assessed (Stroke (Ischemic)): acute neurologic deterioration;situational response  3/15/2020 0418 by Vika Gillespie RN  Outcome: Ongoing (interventions implemented as appropriate)  Flowsheets (Taken 3/14/2020 0515)  Problems Assessed (Stroke (Ischemic)): all  Problems Assessed (Stroke (Ischemic)): acute neurologic deterioration;situational response

## 2020-03-16 ENCOUNTER — TELEPHONE (OUTPATIENT)
Dept: CARDIOLOGY | Facility: CLINIC | Age: 60
End: 2020-03-16

## 2020-03-16 NOTE — PROGRESS NOTES
Case Management Discharge Note      Final Note: Home no additional dc orders noted. Linnea SMITH/CCP         Destination      No service has been selected for the patient.      Durable Medical Equipment      No service has been selected for the patient.      Dialysis/Infusion      No service has been selected for the patient.      Home Medical Care      No service has been selected for the patient.      Therapy      No service has been selected for the patient.      Community Resources      No service has been selected for the patient.        Transportation Services  Private: Car    Final Discharge Disposition Code: 01 - home or self-care

## 2020-03-16 NOTE — TELEPHONE ENCOUNTER
----- Message from GRADY Mcintosh sent at 3/16/2020  2:59 PM EDT -----  Regarding: RE: Hospital Follow up  Yes he should see me in a week and Dr. Erwin in 4 weeks.  The patient wants to change cardiologists.  Dr. Erwin and I saw him in the hospital.  ----- Message -----  From: DeanAmy  Sent: 3/16/2020   2:54 PM EDT  To: GRADY Mcintosh  Subject: Hospital Follow up                               Spoke to Ebenezer @ Dr Navi Mosqueda's office ([phone]). They were wanting to schedule a 4 wk hospital f/u with Dr Erwin. I know you saw patient in hospital. AVS does not state this, but Dr Jovanni Benjamin , Hospitalist notes stated this.  Patient has also seen Dr CORBIN Garcia Cardiologist - last visit 11/9/18. Is this correct? Did you speak with Dr Erwin concerning this patient? Just want to make sure I schedule him correctly.  Thank you,  Amy

## 2020-03-19 ENCOUNTER — TELEPHONE (OUTPATIENT)
Dept: CARDIOLOGY | Facility: CLINIC | Age: 60
End: 2020-03-19

## 2020-03-19 NOTE — TELEPHONE ENCOUNTER
Called patient on the phone he is doing fine he is not having any palpitations he is currently wearing a Holter monitor.  In the hospital they decided to keep him on aspirin and Plavix as his CT the head was more concerning for vascular disease rather than embolic from A. fib.  However he is wearing a Holter monitor now and will see if he is having any atrial arrhythmias.  We are going to cancel his appointment with me on Monday in light of the coronavirus and push out his follow-up appointment till May.  He is in a call us in the meantime if he has any issues.

## 2020-03-20 ENCOUNTER — TELEPHONE (OUTPATIENT)
Dept: CARDIOLOGY | Facility: CLINIC | Age: 60
End: 2020-03-20

## 2020-04-08 PROCEDURE — 0298T HOLTER MONITOR - 72 HOUR UP TO 21 DAY: CPT | Performed by: INTERNAL MEDICINE

## 2020-04-09 ENCOUNTER — TELEPHONE (OUTPATIENT)
Dept: CARDIOLOGY | Facility: CLINIC | Age: 60
End: 2020-04-09

## 2020-04-09 DIAGNOSIS — I48.0 PAROXYSMAL ATRIAL FIBRILLATION (HCC): Primary | ICD-10-CM

## 2020-04-09 NOTE — TELEPHONE ENCOUNTER
I called patient regarding his Holter results.  I also discussed the results with Dr. Erwin.  It seems that he is having episodes of proximal A. fib about 3% of the time with the longest episode being close to 2 hours.  While his stroke appeared to be from small vessel disease of the head he does have an increased risk of stroke due to atrial fibrillation that he is having on this ZIO patch that he wore.  So we are going to stop his aspirin and stop his Plavix and  prescribe Xarelto 20 mg daily.  I talked about the risk and benefits of taking this medication and the symptoms of bleeding to watch for.  He is agreeable with this plan and he has a follow-up appointment with Dr. Erwin in May.

## 2020-04-09 NOTE — TELEPHONE ENCOUNTER
Spoke with the pharmacist, they want to know if they should fill the Xarelto, pt just picked up his Plavix on 3/13/20. Did you tell pt to stop his plavix and start on Xarelto? Pharmacy is holding his Xarelto at the moment. Please advise.     Loren ROSE, GERMÁN

## 2020-04-10 NOTE — TELEPHONE ENCOUNTER
Spoke with the pharmacy advising pt is to stop the plavix and start Xarelto. Called pt, left vm advising of medication change.    Loren ROSE, CMA

## 2020-04-15 ENCOUNTER — TELEPHONE (OUTPATIENT)
Dept: NEUROLOGY | Facility: CLINIC | Age: 60
End: 2020-04-15

## 2020-04-16 NOTE — TELEPHONE ENCOUNTER
Pt has only been seen in hospital. Are you aware of any forms? If not, is this something that can be completed without an in office evaluation?    Please review and advise.

## 2020-04-16 NOTE — TELEPHONE ENCOUNTER
Called and left pt a message to let him know that the ppw is complete. I just need to know where it needs to be sent due to pt portion not being completed yet.

## 2020-05-20 ENCOUNTER — OFFICE VISIT (OUTPATIENT)
Dept: CARDIOLOGY | Facility: CLINIC | Age: 60
End: 2020-05-20

## 2020-05-20 VITALS
DIASTOLIC BLOOD PRESSURE: 84 MMHG | TEMPERATURE: 96.9 F | WEIGHT: 202.8 LBS | HEIGHT: 70 IN | HEART RATE: 64 BPM | BODY MASS INDEX: 29.03 KG/M2 | SYSTOLIC BLOOD PRESSURE: 134 MMHG

## 2020-05-20 DIAGNOSIS — I63.81 LACUNAR STROKE (HCC): ICD-10-CM

## 2020-05-20 DIAGNOSIS — I10 ESSENTIAL HYPERTENSION: ICD-10-CM

## 2020-05-20 DIAGNOSIS — I48.0 PAROXYSMAL ATRIAL FIBRILLATION (HCC): Primary | ICD-10-CM

## 2020-05-20 PROCEDURE — 99441 PR PHYS/QHP TELEPHONE EVALUATION 5-10 MIN: CPT | Performed by: INTERNAL MEDICINE

## 2020-05-20 RX ORDER — MELATONIN
1000 DAILY
COMMUNITY

## 2020-05-20 RX ORDER — DILTIAZEM HYDROCHLORIDE 360 MG/1
360 CAPSULE, EXTENDED RELEASE ORAL DAILY
Qty: 90 CAPSULE | Refills: 3 | Status: SHIPPED | OUTPATIENT
Start: 2020-05-20 | End: 2020-06-02 | Stop reason: SDUPTHER

## 2020-05-20 NOTE — PROGRESS NOTES
So he is a young man age 60 who had a lacunar infarct in March of 2020.  At that time he also has been having paroxysmal atrial fibrillation that he does not really feel.  Echocardiogram since normal he has been ordered to put on it showed 3% A. fib.  He has hypertension and his blood pressure okay but he has never low.  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  Not out of apnea.  He does not drink excessively he does not smoke he does not have diabetes.    At this point I think he is doing pretty well I am going to increase his Cardizem to 360 a day and like to see his blood pressure down just a little bit he thinks that he may be having a problem with his lipid medicine which is Lipitor I told him to stop it and call us in 2 weeks and see if it is better if it is better then I will switch him to Crestor at 5 a day if it does not.  Nevertheless this month we will have him come back to see me in August.  I spent 10 minutes of phone with him in the treatment of charting.  With a telemedicine visit and he consented to the visit via phone

## 2020-06-02 ENCOUNTER — OFFICE VISIT (OUTPATIENT)
Dept: NEUROLOGY | Facility: CLINIC | Age: 60
End: 2020-06-02

## 2020-06-02 VITALS
DIASTOLIC BLOOD PRESSURE: 98 MMHG | HEIGHT: 70 IN | HEART RATE: 76 BPM | BODY MASS INDEX: 30.06 KG/M2 | OXYGEN SATURATION: 96 % | SYSTOLIC BLOOD PRESSURE: 174 MMHG | WEIGHT: 210 LBS

## 2020-06-02 DIAGNOSIS — I10 ESSENTIAL HYPERTENSION: ICD-10-CM

## 2020-06-02 DIAGNOSIS — I48.0 PAROXYSMAL ATRIAL FIBRILLATION (HCC): ICD-10-CM

## 2020-06-02 DIAGNOSIS — I63.81 LACUNAR STROKE (HCC): Primary | ICD-10-CM

## 2020-06-02 PROCEDURE — 99214 OFFICE O/P EST MOD 30 MIN: CPT | Performed by: NURSE PRACTITIONER

## 2020-06-02 RX ORDER — DILTIAZEM HYDROCHLORIDE 360 MG/1
360 CAPSULE, EXTENDED RELEASE ORAL DAILY
Qty: 90 CAPSULE | Refills: 3 | Status: SHIPPED | OUTPATIENT
Start: 2020-06-02 | End: 2021-05-21

## 2020-06-02 RX ORDER — ACETAMINOPHEN AND CODEINE PHOSPHATE 60; 300 MG/1; MG/1
1 TABLET ORAL EVERY 4 HOURS PRN
COMMUNITY

## 2020-06-02 RX ORDER — CYCLOBENZAPRINE HCL 10 MG
10 TABLET ORAL DAILY
COMMUNITY

## 2020-06-02 NOTE — PROGRESS NOTES
DOS: 2020  NAME: James Snellen   : 1960  PCP: Navi Mosqueda    Chief Complaint   Patient presents with   • Stroke      Patient is unaccompanied to today's visit.    Neurological Problem and Interval History:  60 y.o. right-handed male with a Hx of Hypertension, former tobacco abuse, prior paroxysmal atrial fibrillation; recent confirmation of A. fib per long-term Holter now on Xarelto seen today in follow-up for right lateral thalamus infarct extending into right renal capsule.    Present to outside facility on 3/13/2020 due to complaint of left-sided heaviness/numbness upon awakening from a nap.  Given last known normal unknown patient was out of the time treatment window for anti-thrombolytic therapy.  Initial CT of the head was negative for acute findings.  Patient was subsequently given loading dose of Plavix and transferred to [facility] for further evaluation.  On arrival to our facility patient's NIHSS was 0.  CTA of the head and neck was completed which revealed acute lacunar infarct extending into the lateral right thalamus and internal capsule.  Atherosclerotic plaque was noted in left ICA (10 to 20%) with mild to moderate stenosis noted distal P2 of the left PCA thought to be secondary to intracranial atherosclerosis.  MRI of the brain later revealed an acute lacunar infarct in the right lateral thalamus extending into the right internal capsule.  There was some evidence of small vessel disease entheses minimal).  Surface echo revealed ejection fraction 68%.  LV/LA normal.  No evidence of PFO/saline test was negative.  Left atrial volume index was 27.  Etiology of the CVA was initially thought to be secondary to apparent small vessel thrombus due to uncontrolled risk factors specifically hypertension and hyperlipidemia although audio embolic source could not be included given the patient had history of paroxysmal atrial fibrillation.  Patient was initially placed on Xarelto;  cardiology consulted and patient was transitioned to dual oral antiplatelets and Xarelto was discontinued with recommendation to complete long-term Holter.  Long-term Holter revealed evidence of A. fib therefore dual oral antiplatelets have since been discontinued and Xarelto 20 mg have been resumed.  Patient is tolerating without any difficulty along with high-dose statin.    Since discharge, patient denies any new signs and/or symptoms of stroke.  Reports no residual deficits.  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  He reports systolic blood pressure has been elevated he had a video visit with Dr. Erwin recently who increased his Cardizem although patient reports that he has not started it yet; picked up prescription earlier today.  He is currently on 3 antihypertensive without adequate blood pressure control; will ask that cardiology consider renal artery ultrasound.  He did denies any issues with mood specifically no concern for depression and/or PBA.  He denies any issues with sleep specifically no concern for RA although he is at moderate risk 3/8.  He does report that he often feels unrested which he attributes to third shift; offered RA evaluation which patient declined at this time.  PBA screening 7.  PHQ 9 screening tool 0.  He denies any other complaints and or concerns.  He has had no recent falls and or illnesses.      Review of Systems:        Review of Systems   Constitutional: Negative for activity change, appetite change and unexpected weight change.   HENT: Positive for tinnitus. Negative for facial swelling, trouble swallowing and voice change.    Eyes: Negative for photophobia, pain and visual disturbance.   Respiratory: Negative for chest tightness, shortness of breath and wheezing.    Cardiovascular: Negative for chest pain, palpitations and leg swelling.   Gastrointestinal: Negative for abdominal pain, nausea and vomiting.  "  Endocrine: Negative for polydipsia and polyphagia.   Musculoskeletal: Positive for arthralgias (arthritis) and back pain (arthritis). Negative for gait problem, joint swelling, myalgias, neck pain and neck stiffness.   Neurological: Negative for dizziness, tremors, seizures, syncope, facial asymmetry, speech difficulty, weakness, light-headedness, numbness and headaches.   Hematological: Does not bruise/bleed easily.   Psychiatric/Behavioral: Positive for sleep disturbance. Negative for agitation, behavioral problems, confusion, decreased concentration, dysphoric mood, hallucinations, self-injury and suicidal ideas. The patient is not nervous/anxious and is not hyperactive.          Current Outpatient Medications:   •  acetaminophen-codeine (TYLENOL #4) 300-60 MG per tablet, Take 1 tablet by mouth Every 4 (Four) Hours As Needed for Moderate Pain ., Disp: , Rfl:   •  cholecalciferol (VITAMIN D3) 25 MCG (1000 UT) tablet, Take 1,000 Units by mouth Daily., Disp: , Rfl:   •  cyclobenzaprine (FLEXERIL) 10 MG tablet, Take 10 mg by mouth Daily., Disp: , Rfl:   •  dilTIAZem CD (CARDIZEM CD) 360 MG 24 hr capsule, Take 1 capsule by mouth Daily., Disp: 90 capsule, Rfl: 3  •  lisinopril (PRINIVIL,ZESTRIL) 40 MG tablet, Take 40 mg by mouth Daily., Disp: , Rfl:   •  MELATONIN ER PO, Take  by mouth., Disp: , Rfl:   •  metoprolol tartrate (LOPRESSOR) 25 MG tablet, Take 25 mg by mouth 2 (Two) Times a Day., Disp: , Rfl:   •  rivaroxaban (Xarelto) 20 MG tablet, Take 1 tablet by mouth Daily., Disp: 30 tablet, Rfl: 11    \"The following portions of the patient's history were reviewed and updated as appropriate: allergies, current medications, past family history, past medical history, past social history, past surgical history and problem list.\"  Review and Interpretation of Imaging:  Reviewed inpatient work-up discussed above.  Laboratory Results:             Lab Results   Component Value Date    HGBA1C 5.83 (H) 03/13/2020         Lab " Results   Component Value Date    CHOL 204 (H) 03/13/2020         Lab Results   Component Value Date    HDL 45 03/13/2020         Lab Results   Component Value Date     (H) 03/13/2020         Lab Results   Component Value Date    TRIG 73 03/13/2020     No results found for: RPR  Lab Results   Component Value Date    TSH 1.560 03/13/2020     Lab Results   Component Value Date    VHTRNJOS84 480 03/13/2020       Physical Examination: NIHSS:  mRS:   General Appearance:   Well developed, well nourished, well groomed, alert, and cooperative.  HEENT: Normocephalic.    Neck and Spine: Normal range of motion.  Normal alignment. No mass or tenderness. No bruits.  Cardiac: Regular rate and rhythm. No murmurs.  Peripheral Vasculature: Radial and pedal pulses are equal and symmetric.   Extremities:    No edema or deformities. Normal joint ROM.  Skin:    No rashes or birth marks.    Neurological examination:  Higher Integrative  Function: Oriented to time, place and person. Normal registration, recall, attention span and concentration. Normal language including comprehension, spontaneous speech, repetition, reading, writing, naming and vocabulary. No neglect with normal visual-spatial function and construction. Normal fund of knowledge and higher integrative function.  CN II: Pupils are equal, round, and reactive to light. Normal visual acuity and visual fields.    CN III IV VI: Extraocular movements are full without nystagmus.   CN V: Normal facial sensation and strength of muscles of mastication.  CN VII: Facial movements are symmetric. No weakness.  CN VIII:   Auditory acuity is normal.  CN IX & X:   Symmetric palatal movement.  CN XI: Sternocleidomastoid and trapezius are normal.  No weakness.  CN XII:   The tongue is midline.  No atrophy or fasciculations.  Motor: Normal muscle strength, bulk and tone in upper and lower extremities.  No fasciculations, rigidity, spasticity, or abnormal movements.  Reflexes: Plantar  responses are flexor.  Sensation: Normal to light touch in arms and legs.   Station and Gait: Normal gait and station.    Coordination:  Finger to nose test shows no dysmetria.        Diagnoses / Discussion: Is a 60-year-old male with history of Hypertension, former tobacco abuse, prior paroxysmal atrial fibrillation; recent confirmation of A. fib per long-term Holter now on Xarelto who I saw today in  follow-up for right lateral thalamus infarct extending into right renal capsule etiology of the to be secondary to uncontrolled risk factors; since discharge long-term Holter revealed A. fib and patient since switch from dual oral antiplatelets to Xarelto 20 mg daily (NOAC) along with high-dose statin.  Patient is tolerating medication without any side effects.  He denies any new signs and symptoms of stroke.  He has returned to his neurologic baseline.  He has no residual deficits.  He continues to complete all ADLs without any assistance.  His modified Sofia score is 0.  He continues to work full-time on third shift as a .  Recommendations below.  Call with any questions.  Keep planned follow-up.        Plan:   Continue Xarelto and statin as written   Consider RA evaluation in future   Discussed B12 replacement   Blood pressure control to <130/80   Goal LDL <70-recommend high dose statins-    Serum glucose < 140   Call 911 for stroke any stroke symptoms   Follow-up 6 months  David was seen today for stroke.    Diagnoses and all orders for this visit:    Lacunar stroke (CMS/HCC)    Paroxysmal atrial fibrillation (CMS/HCC)    Essential hypertension        MDM  Reviewed: previous chart, nursing note and vitals  Reviewed previous: labs, MRI and CT scan  Interpretation: labs, MRI, CT scan, ultrasound and ECG

## 2020-08-13 ENCOUNTER — TELEPHONE (OUTPATIENT)
Dept: CARDIOLOGY | Facility: CLINIC | Age: 60
End: 2020-08-13

## 2020-08-13 NOTE — TELEPHONE ENCOUNTER
Left message for patient to go over his cholesterol readings.  His LDL is really high and we had taken him off his Lipitor due to some side effects and possibly switch him to Crestor.

## 2020-08-17 ENCOUNTER — TELEPHONE (OUTPATIENT)
Dept: CARDIOLOGY | Facility: CLINIC | Age: 60
End: 2020-08-17

## 2020-08-17 RX ORDER — ROSUVASTATIN CALCIUM 5 MG/1
5 TABLET, COATED ORAL DAILY
Qty: 30 TABLET | Refills: 11 | Status: SHIPPED | OUTPATIENT
Start: 2020-08-17 | End: 2021-08-18

## 2020-10-23 ENCOUNTER — HOSPITAL ENCOUNTER (OUTPATIENT)
Dept: CARDIOLOGY | Facility: HOSPITAL | Age: 60
Discharge: HOME | End: 2020-10-23
Admitting: NURSE PRACTITIONER

## 2020-10-23 ENCOUNTER — OFFICE VISIT (OUTPATIENT)
Dept: CARDIOLOGY | Facility: CLINIC | Age: 60
End: 2020-10-23

## 2020-10-23 ENCOUNTER — TELEPHONE (OUTPATIENT)
Dept: CARDIOLOGY | Facility: CLINIC | Age: 60
End: 2020-10-23

## 2020-10-23 VITALS
HEIGHT: 70 IN | WEIGHT: 202.6 LBS | SYSTOLIC BLOOD PRESSURE: 158 MMHG | BODY MASS INDEX: 29.01 KG/M2 | HEART RATE: 79 BPM | DIASTOLIC BLOOD PRESSURE: 88 MMHG

## 2020-10-23 DIAGNOSIS — I10 ESSENTIAL HYPERTENSION: ICD-10-CM

## 2020-10-23 DIAGNOSIS — I10 ESSENTIAL HYPERTENSION: Primary | ICD-10-CM

## 2020-10-23 DIAGNOSIS — I48.0 PAROXYSMAL ATRIAL FIBRILLATION (HCC): ICD-10-CM

## 2020-10-23 DIAGNOSIS — I63.81 LACUNAR STROKE (HCC): ICD-10-CM

## 2020-10-23 LAB
ALBUMIN SERPL-MCNC: 4.4 G/DL (ref 3.5–5.2)
ALBUMIN/GLOB SERPL: 1.5 G/DL
ALP SERPL-CCNC: 107 U/L (ref 39–117)
ALT SERPL W P-5'-P-CCNC: 22 U/L (ref 1–41)
ANION GAP SERPL CALCULATED.3IONS-SCNC: 9.4 MMOL/L (ref 5–15)
AST SERPL-CCNC: 21 U/L (ref 1–40)
BILIRUB SERPL-MCNC: 0.4 MG/DL (ref 0–1.2)
BUN SERPL-MCNC: 13 MG/DL (ref 8–23)
BUN/CREAT SERPL: 15.7 (ref 7–25)
CALCIUM SPEC-SCNC: 9.4 MG/DL (ref 8.6–10.5)
CHLORIDE SERPL-SCNC: 103 MMOL/L (ref 98–107)
CHOLEST SERPL-MCNC: 132 MG/DL (ref 0–200)
CO2 SERPL-SCNC: 23.6 MMOL/L (ref 22–29)
CREAT SERPL-MCNC: 0.83 MG/DL (ref 0.76–1.27)
GFR SERPL CREATININE-BSD FRML MDRD: 95 ML/MIN/1.73
GLOBULIN UR ELPH-MCNC: 3 GM/DL
GLUCOSE SERPL-MCNC: 91 MG/DL (ref 65–99)
HDLC SERPL-MCNC: 47 MG/DL (ref 40–60)
LDLC SERPL CALC-MCNC: 72 MG/DL (ref 0–100)
LDLC/HDLC SERPL: 1.54 {RATIO}
POTASSIUM SERPL-SCNC: 3.7 MMOL/L (ref 3.5–5.2)
PROT SERPL-MCNC: 7.4 G/DL (ref 6–8.5)
SODIUM SERPL-SCNC: 136 MMOL/L (ref 136–145)
TRIGL SERPL-MCNC: 62 MG/DL (ref 0–150)
VLDLC SERPL-MCNC: 13 MG/DL (ref 5–40)

## 2020-10-23 PROCEDURE — 99214 OFFICE O/P EST MOD 30 MIN: CPT | Performed by: NURSE PRACTITIONER

## 2020-10-23 PROCEDURE — 36415 COLL VENOUS BLD VENIPUNCTURE: CPT

## 2020-10-23 PROCEDURE — 80061 LIPID PANEL: CPT | Performed by: NURSE PRACTITIONER

## 2020-10-23 PROCEDURE — 93000 ELECTROCARDIOGRAM COMPLETE: CPT | Performed by: NURSE PRACTITIONER

## 2020-10-23 PROCEDURE — 80053 COMPREHEN METABOLIC PANEL: CPT | Performed by: NURSE PRACTITIONER

## 2020-10-23 RX ORDER — METOPROLOL TARTRATE 50 MG/1
50 TABLET, FILM COATED ORAL 2 TIMES DAILY
Qty: 180 TABLET | Refills: 3 | Status: SHIPPED | OUTPATIENT
Start: 2020-10-23 | End: 2022-03-21

## 2020-10-23 NOTE — PROGRESS NOTES
Date of Office Visit: 10/23/2020  Encounter Provider: GRADY Mcintosh  Place of Service: [facility] CARDIOLOGY  Patient Name: James Snellen  :1960    Chief Complaint   Patient presents with   • Atrial Fibrillation   • Cerebrovascular Accident   :     HPI: James Snellen is a 60-year-old male who is a patient of Dr. Erwin and is new to me today but I have spoken with him previously on the phone.  He has a history of a lacunar infarct in 2020.  He had been having some paroxysmal A. fib and he really did not feel it.  He had a monitor and he showed 3% of the time he was in A. fib.  He had a normal echo.  His other risk factors such as hypertension.  He was last seen via telehealth on May 20th, 2020 by Dr. Erwin. we increased his Cardizem 360mg Daily to get his BP down. He is also is having some trouble with his lipid medicine and will stopped it for couple weeks and then we were going to consider changing him to Crestor.  He did feel better after stopping Lipitor and we called him in Crestor 5 mg daily.    Since that time he has not been having an myalgias.  He works night shift as a .  He is not having any palpitations or shortness of breath he chronically is a little tired from his job.  He has not been checking his blood pressure at home today in the office it is 158/88 and at his neurologist it was in the 170s back in .  He denies any edema in his legs.    Past Medical History:   Diagnosis Date   • Afib (CMS/HCC)    • Arthritis    • HL (hearing loss)    • Hypertension    • Hypertension    • Lacunar stroke (CMS/HCC)    • PAF (paroxysmal atrial fibrillation) (CMS/HCC)    • Patient had no falls in past year    • Stroke (CMS/HCC)        Past Surgical History:   Procedure Laterality Date   • COLONOSCOPY     • KNEE SURGERY         Social History     Socioeconomic History   • Marital status:      Spouse name: Not on file   • Number of children:  Not on file   • Years of education: Not on file   • Highest education level: Not on file   Tobacco Use   • Smoking status: Former Smoker   • Smokeless tobacco: Never Used   Substance and Sexual Activity   • Alcohol use: Yes     Alcohol/week: 5.0 standard drinks     Types: 5 Cans of beer per week   • Drug use: Never       Family History   Problem Relation Age of Onset   • Transient ischemic attack Mother    • Arthritis Mother    • Heart disease Mother    • Hypertension Mother    • Stroke Mother    • Heart attack Father    • Heart disease Father    • Hypertension Father    • Diabetes Brother        Review of Systems   Constitution: Negative for diaphoresis and malaise/fatigue.   Cardiovascular: Negative for chest pain, claudication, dyspnea on exertion, irregular heartbeat, leg swelling, near-syncope, orthopnea, palpitations, paroxysmal nocturnal dyspnea and syncope.   Respiratory: Negative for cough, shortness of breath and sleep disturbances due to breathing.    Musculoskeletal: Negative for falls.   Neurological: Negative for dizziness and weakness.   Psychiatric/Behavioral: Negative for altered mental status and substance abuse.       No Known Allergies      Current Outpatient Medications:   •  acetaminophen-codeine (TYLENOL #4) 300-60 MG per tablet, Take 1 tablet by mouth Every 4 (Four) Hours As Needed for Moderate Pain ., Disp: , Rfl:   •  cholecalciferol (VITAMIN D3) 25 MCG (1000 UT) tablet, Take 1,000 Units by mouth Daily., Disp: , Rfl:   •  cyclobenzaprine (FLEXERIL) 10 MG tablet, Take 10 mg by mouth Daily., Disp: , Rfl:   •  dilTIAZem CD (CARDIZEM CD) 360 MG 24 hr capsule, Take 1 capsule by mouth Daily., Disp: 90 capsule, Rfl: 3  •  lisinopril (PRINIVIL,ZESTRIL) 40 MG tablet, Take 40 mg by mouth Daily., Disp: , Rfl:   •  MELATONIN ER PO, Take  by mouth., Disp: , Rfl:   •  rivaroxaban (Xarelto) 20 MG tablet, Take 1 tablet by mouth Daily., Disp: 30 tablet, Rfl: 11  •  rosuvastatin (CRESTOR) 5 MG tablet, Take 1  "tablet by mouth Daily., Disp: 30 tablet, Rfl: 11  •  metoprolol tartrate (LOPRESSOR) 50 MG tablet, Take 1 tablet by mouth 2 (Two) Times a Day., Disp: 180 tablet, Rfl: 3      Objective:     Vitals:    10/23/20 1238   BP: 158/88   Pulse: 79   Weight: 91.9 kg (202 lb 9.6 oz)   Height: 177.8 cm (70\")     Body mass index is 29.07 kg/m².    PHYSICAL EXAM:    Constitutional:       General: Not in acute distress.     Appearance: Normal appearance. Well-developed.   Eyes:      Pupils: Pupils are equal, round, and reactive to light.   HENT:      Head: Normocephalic.   Neck:      Musculoskeletal: Normal range of motion and neck supple. No edema.      Vascular: No carotid bruit or JVD.   Pulmonary:      Effort: Pulmonary effort is normal. No tachypnea.      Breath sounds: Normal breath sounds. No wheezing. No rales.   Cardiovascular:      Normal rate. Regular rhythm.      No gallop.   Pulses:     Intact distal pulses.   Abdominal:      General: Bowel sounds are normal.      Palpations: Abdomen is soft.      Tenderness: There is no abdominal tenderness.   Musculoskeletal: Normal range of motion.   Skin:     General: Skin is warm and dry.   Neurological:      Mental Status: Alert and oriented to person, place, and time.           ECG 12 Lead    Date/Time: 10/23/2020 12:55 PM  Performed by: Veda Rothman APRN  Authorized by: Veda Rothman APRN   Comparison: compared with previous ECG from 3/13/2020  Rhythm: sinus rhythm  Rate: normal  QRS axis: left    Clinical impression: normal ECG              Assessment:       Diagnosis Plan   1. Essential hypertension  ECG 12 Lead    Lipid Panel    Comprehensive Metabolic Panel   2. Paroxysmal atrial fibrillation (CMS/HCC)  ECG 12 Lead   3. Lacunar stroke (CMS/HCC)       Orders Placed This Encounter   Procedures   • Lipid Panel     Standing Status:   Future     Standing Expiration Date:   10/23/2021   • Comprehensive Metabolic Panel     Standing Status:   Future     Standing " Expiration Date:   10/23/2021   • ECG 12 Lead     This order was created via procedure documentation          Plan:       I am getting increase his metoprolol to 50 mg twice a day today.  I want him to monitor his blood pressure closely and will see him back in 3 months time.  Also get a check a lipid panel today and a CMP.  Overall he is doing well he is maintained sinus rhythm with his Cardizem and metoprolol.  And anticoagulated with Xarelto.  He has no evidence of bleeding.       Your medication list          Accurate as of October 23, 2020  1:05 PM. If you have any questions, ask your nurse or doctor.            CHANGE how you take these medications      Instructions Last Dose Given Next Dose Due   metoprolol tartrate 50 MG tablet  Commonly known as: LOPRESSOR  What changed:   · medication strength  · how much to take  · when to take this  Changed by: GRADY Mcintosh      Take 1 tablet by mouth 2 (Two) Times a Day.          CONTINUE taking these medications      Instructions Last Dose Given Next Dose Due   acetaminophen-codeine 300-60 MG per tablet  Commonly known as: TYLENOL #4      Take 1 tablet by mouth Every 4 (Four) Hours As Needed for Moderate Pain .       cholecalciferol 25 MCG (1000 UT) tablet  Commonly known as: VITAMIN D3      Take 1,000 Units by mouth Daily.       cyclobenzaprine 10 MG tablet  Commonly known as: FLEXERIL      Take 10 mg by mouth Daily.       dilTIAZem  MG 24 hr capsule  Commonly known as: CARDIZEM CD      Take 1 capsule by mouth Daily.       lisinopril 40 MG tablet  Commonly known as: PRINIVIL,ZESTRIL      Take 40 mg by mouth Daily.       MELATONIN ER PO      Take  by mouth.       rivaroxaban 20 MG tablet  Commonly known as: Xarelto      Take 1 tablet by mouth Daily.       rosuvastatin 5 MG tablet  Commonly known as: CRESTOR      Take 1 tablet by mouth Daily.             Where to Get Your Medications      These medications were sent to [facility] #[id number] -  [place], [place] - [address] AT [facility] OF [address] &  - [phone]  - [phone] FX  [address], [place] [address]    Phone: [phone]   · metoprolol tartrate 50 MG tablet           As always, it has been a pleasure to participate in your patient's care.      Sincerely,     Veda RASMUSSEN

## 2020-11-06 ENCOUNTER — OFFICE VISIT (OUTPATIENT)
Dept: NEUROLOGY | Facility: CLINIC | Age: 60
End: 2020-11-06

## 2020-11-06 VITALS
BODY MASS INDEX: 28.49 KG/M2 | SYSTOLIC BLOOD PRESSURE: 170 MMHG | DIASTOLIC BLOOD PRESSURE: 100 MMHG | WEIGHT: 199 LBS | HEART RATE: 74 BPM | OXYGEN SATURATION: 97 % | HEIGHT: 70 IN

## 2020-11-06 DIAGNOSIS — I48.0 PAROXYSMAL ATRIAL FIBRILLATION (HCC): ICD-10-CM

## 2020-11-06 DIAGNOSIS — I10 ESSENTIAL HYPERTENSION: ICD-10-CM

## 2020-11-06 DIAGNOSIS — I63.81 LACUNAR STROKE (HCC): Primary | ICD-10-CM

## 2020-11-06 DIAGNOSIS — Z91.89 AT RISK FOR OBSTRUCTIVE SLEEP APNEA: ICD-10-CM

## 2020-11-06 PROCEDURE — 99213 OFFICE O/P EST LOW 20 MIN: CPT | Performed by: NURSE PRACTITIONER

## 2020-11-06 NOTE — PROGRESS NOTES
DOS: 2020  NAME: James Snellen   : 1960  PCP: Navi Mosqueda    Chief Complaint   Patient presents with   • Stroke      Patient is unaccompanied to today's visit.      Neurological Problem and Interval History:  60 y.o. right-handed male with a Hx of atrial fibrillation, hypertension and Former tobacco abuse who I am seeing today in follow-up for right lateral thalamus infarct extending into right internal capsule.  Etiology of the stroke secondary to cardioembolic source; Holter monitor revealed atrial fibrillation.       Patient was last seen in follow-up 2020 for the same.  At that time, he denied any new signs and/or symptoms of stroke.  He had no residual deficits and had returned to work; 2 weeks after discharge as a  (third shift).  Today, he continues to deny any new signs and/or symptoms of stroke.  He continues to work full-time.  He has no residual deficits.  He has had no falls.  He uses no assistive devices.  He recently saw cardiology and PCP and had some elevated blood pressures again and his metoprolol was increased.  He is hypertensive on exam today 170/100.  Discussed goal systolic blood pressure less than 130 and diastolic less than 80.  Patient reports that he is quit checking his blood pressure at home but I encouraged him to resume this until his blood pressure is better controlled which he agrees with.  He continues to take Crestor 5 mg and Xarelto 20 mg and is tolerating without any difficulty.  He denies any issues with mood specifically no concern for PBA and/or depression.  He is at moderate risk for RA; previously declined RA evaluation and again today declined RA evaluation.  He has no other complaints under concerns.  I will plan to see him annually moving forward; sooner if requested/indicated.      Review of Systems:        Review of Systems   Constitutional: Negative for activity change, appetite change and unexpected weight change.   HENT: Negative  "for facial swelling, trouble swallowing and voice change.    Eyes: Negative for photophobia, pain and visual disturbance.   Respiratory: Negative for chest tightness, shortness of breath and wheezing.    Cardiovascular: Negative for chest pain, palpitations and leg swelling.   Gastrointestinal: Negative for abdominal pain, nausea and vomiting.   Endocrine: Negative for cold intolerance and heat intolerance.   Musculoskeletal: Negative for arthralgias, back pain, gait problem, joint swelling, myalgias, neck pain and neck stiffness.   Neurological: Negative for dizziness, tremors, seizures, syncope, facial asymmetry, speech difficulty, weakness, light-headedness, numbness and headaches.   Hematological: Does not bruise/bleed easily.   Psychiatric/Behavioral: Negative for agitation, behavioral problems, confusion, decreased concentration, dysphoric mood, hallucinations, self-injury, sleep disturbance and suicidal ideas. The patient is not nervous/anxious and is not hyperactive.          Current Outpatient Medications:   •  acetaminophen-codeine (TYLENOL #4) 300-60 MG per tablet, Take 1 tablet by mouth Every 4 (Four) Hours As Needed for Moderate Pain ., Disp: , Rfl:   •  cholecalciferol (VITAMIN D3) 25 MCG (1000 UT) tablet, Take 1,000 Units by mouth Daily., Disp: , Rfl:   •  cyclobenzaprine (FLEXERIL) 10 MG tablet, Take 10 mg by mouth Daily., Disp: , Rfl:   •  dilTIAZem CD (CARDIZEM CD) 360 MG 24 hr capsule, Take 1 capsule by mouth Daily., Disp: 90 capsule, Rfl: 3  •  lisinopril (PRINIVIL,ZESTRIL) 40 MG tablet, Take 40 mg by mouth Daily., Disp: , Rfl:   •  MELATONIN ER PO, Take  by mouth., Disp: , Rfl:   •  metoprolol tartrate (LOPRESSOR) 50 MG tablet, Take 1 tablet by mouth 2 (Two) Times a Day., Disp: 180 tablet, Rfl: 3  •  rivaroxaban (Xarelto) 20 MG tablet, Take 1 tablet by mouth Daily., Disp: 30 tablet, Rfl: 11  •  rosuvastatin (CRESTOR) 5 MG tablet, Take 1 tablet by mouth Daily., Disp: 30 tablet, Rfl: 11    \"The " "following portions of the patient's history were reviewed and updated as appropriate: allergies, current medications, past family history, past medical history, past social history, past surgical history and problem list.\"  Review and Interpretation of Imaging:  Imaging reviewed   laboratory Results:             Lab Results   Component Value Date    HGBA1C 5.83 (H) 03/13/2020         Lab Results   Component Value Date    CHOL 132 10/23/2020    CHOL 204 (H) 03/13/2020         Lab Results   Component Value Date    HDL 47 10/23/2020    HDL 45 03/13/2020         Lab Results   Component Value Date    LDL 72 10/23/2020     (H) 03/13/2020         Lab Results   Component Value Date    TRIG 62 10/23/2020    TRIG 73 03/13/2020     No results found for: RPR  Lab Results   Component Value Date    TSH 1.560 03/13/2020     Lab Results   Component Value Date    [id number] 480 03/13/2020       Physical Examination: NIHSS: 0 mRS: 0  General Appearance:   Well developed, well nourished, well groomed, alert, and cooperative.  HEENT: Normocephalic.   Neck and Spine: Normal range of motion.  Normal alignment. No mass or tenderness. No bruits.  Cardiac: Regular rate and rhythm. No murmurs.  Peripheral Vasculature: Radial and pedal pulses are equal and symmetric.   Extremities:    No edema or deformities. Normal joint ROM.  Skin:    No rashes or birth marks.    Neurological examination:  Higher Integrative  Function: Oriented to time, place and person. Normal registration, recall, attention span and concentration. Normal language including comprehension, spontaneous speech, repetition, reading, writing, naming and vocabulary. No neglect with normal visual-spatial function and construction. Normal fund of knowledge and higher integrative function.  CN II: Pupils are equal, round, and reactive to light. Normal visual acuity and visual fields.    CN III IV VI: Extraocular movements are full without nystagmus.   CN V: Normal facial " sensation and strength of muscles of mastication.  CN VII: Facial movements are symmetric. No weakness.  CN VIII:   Auditory acuity is normal.  CN IX & X:   Symmetric palatal movement.  CN XI: Sternocleidomastoid and trapezius are normal.  No weakness.  CN XII:   The tongue is midline.  No atrophy or fasciculations.  Motor: Normal muscle strength, bulk and tone in upper and lower extremities.  No fasciculations, rigidity, spasticity, or abnormal movements.  Reflexes: Plantar responses are flexor.  Sensation: Normal to light touch in arms and legs.   Station and Gait: Normal gait and station.    Coordination:  Finger to nose test shows no dysmetria.        Impression:  1.  Right lateral thalamus infarct; etiology secondary to uncontrolled risk factors right) Holter monitor revealed atrial fibrillation now on Xarelto  2.  Hypertension  3.  A. fib        Plan:   Continue Xarelto and Crestor as written   Blood pressure control to <130/80   Goal LDL <70-recommend high dose statins-    Serum glucose < 140   Call 911 for stroke any stroke symptoms   Follow-up annually  Diagnoses and all orders for this visit:    1. Lacunar stroke (CMS/HCC) (Primary)    2. Paroxysmal atrial fibrillation (CMS/HCC)    3. Essential hypertension    4. At risk for obstructive sleep apnea

## 2020-11-13 ENCOUNTER — TELEPHONE (OUTPATIENT)
Dept: NEUROLOGY | Facility: CLINIC | Age: 60
End: 2020-11-13

## 2020-11-13 NOTE — TELEPHONE ENCOUNTER
Attempted to reach patient to follow up with him regarding his blood pressure this week.  Left voicemail to call back.

## 2021-01-29 ENCOUNTER — OFFICE VISIT (OUTPATIENT)
Dept: CARDIOLOGY | Facility: CLINIC | Age: 61
End: 2021-01-29

## 2021-01-29 VITALS
HEIGHT: 70 IN | SYSTOLIC BLOOD PRESSURE: 130 MMHG | BODY MASS INDEX: 30.06 KG/M2 | WEIGHT: 210 LBS | DIASTOLIC BLOOD PRESSURE: 82 MMHG | HEART RATE: 63 BPM

## 2021-01-29 DIAGNOSIS — I10 ESSENTIAL HYPERTENSION: Primary | ICD-10-CM

## 2021-01-29 DIAGNOSIS — I48.0 PAROXYSMAL ATRIAL FIBRILLATION (HCC): ICD-10-CM

## 2021-01-29 DIAGNOSIS — I63.81 LACUNAR STROKE (HCC): ICD-10-CM

## 2021-01-29 PROCEDURE — 99441 PR PHYS/QHP TELEPHONE EVALUATION 5-10 MIN: CPT | Performed by: INTERNAL MEDICINE

## 2021-01-29 NOTE — PROGRESS NOTES
He is here for telemedicine visit via phone that he has consented to    He is a young man age 60 who had a lacunar infarct in March of 2020.  At that time, we also found that he was having asymptomatic paroxysmal A. fib but probably that was not because of his stroke.  He has a history of hypertension and his echo was otherwise normal.  He seems to be doing well if he does not drink a lot of Mountain Dew his blood pressure stays good so we talked about not drinking Mountain Dew he is otherwise asymptomatic right now and I talked with him about getting the vaccine I recommended that he get it to [facility] and he is going do that once a call us category which is the next 1 and when I have him come back and see Veda or Fidelina in a year and then see me and to I spent 10 minutes on the phone with him and 10 minutes charting

## 2021-03-26 DIAGNOSIS — I48.0 PAROXYSMAL ATRIAL FIBRILLATION (HCC): ICD-10-CM

## 2021-03-26 RX ORDER — RIVAROXABAN 20 MG/1
TABLET, FILM COATED ORAL
Qty: 30 TABLET | Refills: 11 | Status: SHIPPED | OUTPATIENT
Start: 2021-03-26 | End: 2022-03-30

## 2021-05-16 VITALS — WEIGHT: 206.12 LBS | HEIGHT: 70 IN | BODY MASS INDEX: 29.51 KG/M2

## 2021-05-21 RX ORDER — DILTIAZEM HYDROCHLORIDE 360 MG/1
360 CAPSULE, EXTENDED RELEASE ORAL DAILY
Qty: 90 CAPSULE | Refills: 3 | Status: SHIPPED | OUTPATIENT
Start: 2021-05-21 | End: 2022-05-17

## 2021-08-18 RX ORDER — ROSUVASTATIN CALCIUM 5 MG/1
5 TABLET, COATED ORAL DAILY
Qty: 90 TABLET | Refills: 2 | Status: SHIPPED | OUTPATIENT
Start: 2021-08-18 | End: 2022-05-17

## 2021-08-24 NOTE — TELEPHONE ENCOUNTER
I talked to him thank you.  
Left patient a message telling him I wanted to discuss his Holter monitor results and to give us a call.  
Patient calling back about his monitor    [phone]  
with patient

## 2021-10-28 ENCOUNTER — TELEPHONE (OUTPATIENT)
Dept: CARDIOLOGY | Facility: CLINIC | Age: 61
End: 2021-10-28

## 2021-10-29 ENCOUNTER — TELEPHONE (OUTPATIENT)
Dept: CARDIOLOGY | Facility: CLINIC | Age: 61
End: 2021-10-29

## 2021-10-29 NOTE — TELEPHONE ENCOUNTER
----- Message from GRADY Mcintosh sent at 10/28/2021  4:08 PM EDT -----  Let patient know I reviewed his lab work and it is stable. Thanks

## 2021-12-03 ENCOUNTER — OFFICE VISIT (OUTPATIENT)
Dept: NEUROLOGY | Facility: CLINIC | Age: 61
End: 2021-12-03

## 2021-12-03 VITALS
HEART RATE: 68 BPM | BODY MASS INDEX: 30.06 KG/M2 | SYSTOLIC BLOOD PRESSURE: 168 MMHG | OXYGEN SATURATION: 96 % | WEIGHT: 210 LBS | HEIGHT: 70 IN | DIASTOLIC BLOOD PRESSURE: 100 MMHG

## 2021-12-03 DIAGNOSIS — I63.81 LACUNAR STROKE (HCC): Primary | ICD-10-CM

## 2021-12-03 DIAGNOSIS — E66.9 CLASS 1 OBESITY WITH BODY MASS INDEX (BMI) OF 30.0 TO 30.9 IN ADULT, UNSPECIFIED OBESITY TYPE, UNSPECIFIED WHETHER SERIOUS COMORBIDITY PRESENT: ICD-10-CM

## 2021-12-03 DIAGNOSIS — Z91.89 AT RISK FOR OBSTRUCTIVE SLEEP APNEA: ICD-10-CM

## 2021-12-03 DIAGNOSIS — I10 ESSENTIAL HYPERTENSION: ICD-10-CM

## 2021-12-03 DIAGNOSIS — I48.0 PAROXYSMAL ATRIAL FIBRILLATION (HCC): ICD-10-CM

## 2021-12-03 PROCEDURE — 99213 OFFICE O/P EST LOW 20 MIN: CPT | Performed by: NURSE PRACTITIONER

## 2021-12-03 RX ORDER — AMLODIPINE BESYLATE 5 MG/1
5 TABLET ORAL DAILY
COMMUNITY
Start: 2021-10-26 | End: 2022-12-16

## 2021-12-03 NOTE — PROGRESS NOTES
DOS: 12/3/2021  NAME: James T Snellen   : 1960  PCP: Navi Mosqueda    Chief Complaint   Patient presents with   • Stroke      Patient is unaccompanied to today's visit.      Neurological Problem and Interval History:  61 y.o. right-handed male with a Hx of Atrial fibrillation, hypertension and tobacco abuse; previously quit has since resumed (approximately 1 pack/day) M seen today in follow-up for right lateral thalamus infarct extending into the right internal capsule etiology of the stroke felt to be secondary to cardioembolic source as Holter since revealed atrial fibrillation-patient on Eliquis 20 mg daily and low-dose statin.     Patient was last seen in follow-up with me 2020 for the same.  At that time, he denied any new signs and/or symptoms of stroke.  He continues to deny any new signs and/or symptoms of stroke.  Patient continues to work full-time; works third shift.  Today patient's blood pressure is somewhat elevated at 168/100 and he reports that he has been up since 4 AM yesterday this only happens on  due to shift work.  Discussed systolic blood pressure goal less than 140 and diastolic blood pressure goal less than 90; patient verbalizes understanding.  He denies any issues with mood specifically no concern for PBA and/or depression.  Patient reports good quality of sleep; approximately 6 hours per night.  He does report that he is snores, additionally patient has high blood pressure is male and is age greater than 50 therefore he is at moderate risk for obstructive sleep apnea; patient reports he had RA evaluation about 5 to 6 years ago which was unremarkable.  I discussed potential repeat RA evaluation which patient declined at this time.  Discussed risk and benefits of untreated apnea in setting of arrhythmias and increased risk for stroke which patient verbalizes understanding of.  Denies any recent illnesses/hospitalizations and/or falls.  He denies any other complaints and  or concerns on my exam.  I will plan to see patient back as needed moving forward.      Review of Systems:        Review of Systems   Constitutional: Negative for activity change, appetite change and unexpected weight change.   HENT: Negative for facial swelling and trouble swallowing.    Eyes: Negative for photophobia, pain and visual disturbance.   Respiratory: Negative for chest tightness, shortness of breath and wheezing.    Cardiovascular: Negative for chest pain, palpitations and leg swelling.   Gastrointestinal: Negative for abdominal pain, nausea and vomiting.   Musculoskeletal: Negative for arthralgias, back pain, gait problem, joint swelling, myalgias, neck pain and neck stiffness.   Neurological: Negative for dizziness, tremors, seizures, syncope, facial asymmetry, speech difficulty, weakness, light-headedness, numbness and headaches.   Hematological: Does not bruise/bleed easily.   Psychiatric/Behavioral: Negative for agitation, behavioral problems, confusion, decreased concentration, dysphoric mood, hallucinations, self-injury, sleep disturbance and suicidal ideas. The patient is not nervous/anxious and is not hyperactive.          Current Outpatient Medications:   •  acetaminophen-codeine (TYLENOL #4) 300-60 MG per tablet, Take 1 tablet by mouth Every 4 (Four) Hours As Needed for Moderate Pain ., Disp: , Rfl:   •  amLODIPine (NORVASC) 5 MG tablet, Take 5 mg by mouth Daily. for blood pressure., Disp: , Rfl:   •  cholecalciferol (VITAMIN D3) 25 MCG (1000 UT) tablet, Take 1,000 Units by mouth Daily., Disp: , Rfl:   •  cyclobenzaprine (FLEXERIL) 10 MG tablet, Take 10 mg by mouth Daily., Disp: , Rfl:   •  dilTIAZem CD (CARDIZEM CD) 360 MG 24 hr capsule, TAKE 1 CAPSULE BY MOUTH DAILY, Disp: 90 capsule, Rfl: 3  •  lisinopril (PRINIVIL,ZESTRIL) 40 MG tablet, Take 40 mg by mouth Daily., Disp: , Rfl:   •  MELATONIN ER PO, Take  by mouth., Disp: , Rfl:   •  metoprolol tartrate (LOPRESSOR) 50 MG tablet, Take 1  "tablet by mouth 2 (Two) Times a Day., Disp: 180 tablet, Rfl: 3  •  rosuvastatin (CRESTOR) 5 MG tablet, TAKE 1 TABLET BY MOUTH DAILY, Disp: 90 tablet, Rfl: 2  •  Xarelto 20 MG tablet, TAKE 1 TABLET BY MOUTH DAILY, Disp: 30 tablet, Rfl: 11    \"The following portions of the patient's history were reviewed and updated as appropriate: allergies, current medications, past family history, past medical history, past social history, past surgical history and problem list.\"  Review and Interpretation of Imaging:  No new imaging reviewed  Laboratory Results:             Lab Results   Component Value Date    HGBA1C 5.83 (H) 03/13/2020         Lab Results   Component Value Date    CHOL 132 10/23/2020    CHOL 204 (H) 03/13/2020         Lab Results   Component Value Date    HDL 47 10/23/2020    HDL 45 03/13/2020         Lab Results   Component Value Date    LDL 72 10/23/2020     (H) 03/13/2020         Lab Results   Component Value Date    TRIG 62 10/23/2020    TRIG 73 03/13/2020     No results found for: RPR  Lab Results   Component Value Date    TSH 1.560 03/13/2020     Lab Results   Component Value Date    CTGCOFLD26 480 03/13/2020     Physical Examination: NIHSS:  0      mRS: 0  General Appearance:           Well developed, well nourished, well groomed, alert, and cooperative.  HEENT:            Normocephalic.    Neck and Spine:         Normal range of motion.  Normal alignment. No mass or tenderness. No bruits.  Cardiac:                                 Regular rate and rhythm. No murmurs.  Peripheral Vasculature:       Radial and pedal pulses are equal and symmetric.   Extremities:                           No edema or deformities. Normal joint ROM.  Skin:                                       No rashes or birth marks.     Neurological examination:  Higher Integrative  Function:         Oriented to time, place and person. Normal registration, recall, attention span and concentration. Normal language including " comprehension, spontaneous speech, repetition, reading, writing, naming and vocabulary. No neglect with normal visual-spatial function and construction. Normal fund of knowledge and higher integrative function.  CN II:    Pupils are equal, round, and reactive to light. Normal visual acuity and visual fields.    CN III IV VI:      Extraocular movements are full without nystagmus.   CN V:   Normal facial sensation and strength of muscles of mastication.  CN VII:             Facial movements are symmetric. No weakness.  CN VIII:                                   Auditory acuity is normal.  CN IX & X:                              Symmetric palatal movement.  CN XI:  Sternocleidomastoid and trapezius are normal.  No weakness.  CN XII:                                    The tongue is midline.  No atrophy or fasciculations.  Motor:  Normal muscle strength, bulk and tone in upper and lower extremities.  No fasciculations, rigidity, spasticity, or abnormal movements.  Reflexes:         Plantar responses are flexor.  Sensation:       Normal to light touch in arms and legs.   Station and Gait:        Normal gait and station.    Coordination:                        Finger to nose test shows no dysmetria    Diagnoses:   1.  History of right lateral thalamic infarct; etiology felt to be secondary to uncontrolled risk factors and primarily cardioembolic source as Holter monitor revealed atrial fibrillation  2.  Hypertension  3.  A. fib: Fully anticoagulated on Xarelto  4.  Hyperlipidemia  5.  Obesity; BMI 30.13    Plan:   Continue Xarelto 20 mg daily, Crestor 5 mg daily    Recommend consideration of RA evaluation in the near future   Blood pressure control to <130/80   Goal LDL <70-recommend high dose statins-    Serum glucose < 140   Call 911 for stroke any stroke symptoms   Follow-up with me as needed moving forward      Diagnoses and all orders for this visit:    1. Lacunar stroke (HCC) (Primary)    2. Essential  hypertension    3. At risk for obstructive sleep apnea    4. Paroxysmal atrial fibrillation (HCC)    5. Class 1 obesity with body mass index (BMI) of 30.0 to 30.9 in adult, unspecified obesity type, unspecified whether serious comorbidity present

## 2022-03-21 RX ORDER — METOPROLOL TARTRATE 50 MG/1
TABLET, FILM COATED ORAL
Qty: 180 TABLET | Refills: 3 | Status: SHIPPED | OUTPATIENT
Start: 2022-03-21

## 2022-03-29 DIAGNOSIS — I48.0 PAROXYSMAL ATRIAL FIBRILLATION: ICD-10-CM

## 2022-03-30 RX ORDER — RIVAROXABAN 20 MG/1
TABLET, FILM COATED ORAL
Qty: 30 TABLET | Refills: 11 | Status: SHIPPED | OUTPATIENT
Start: 2022-03-30 | End: 2023-03-31

## 2022-05-06 ENCOUNTER — OFFICE VISIT (OUTPATIENT)
Dept: CARDIOLOGY | Facility: CLINIC | Age: 62
End: 2022-05-06

## 2022-05-06 VITALS
BODY MASS INDEX: 28.63 KG/M2 | SYSTOLIC BLOOD PRESSURE: 150 MMHG | DIASTOLIC BLOOD PRESSURE: 90 MMHG | HEART RATE: 64 BPM | WEIGHT: 200 LBS | HEIGHT: 70 IN

## 2022-05-06 DIAGNOSIS — I48.0 PAROXYSMAL ATRIAL FIBRILLATION: ICD-10-CM

## 2022-05-06 DIAGNOSIS — I10 PRIMARY HYPERTENSION: Primary | ICD-10-CM

## 2022-05-06 DIAGNOSIS — Z86.73 H/O: STROKE: ICD-10-CM

## 2022-05-06 PROCEDURE — 93000 ELECTROCARDIOGRAM COMPLETE: CPT | Performed by: NURSE PRACTITIONER

## 2022-05-06 PROCEDURE — 99214 OFFICE O/P EST MOD 30 MIN: CPT | Performed by: NURSE PRACTITIONER

## 2022-05-06 PROCEDURE — 99406 BEHAV CHNG SMOKING 3-10 MIN: CPT | Performed by: NURSE PRACTITIONER

## 2022-05-06 NOTE — PROGRESS NOTES
Date of Office Visit: 2022  Encounter Provider: GRADY Mcintosh  Place of Service: [facility] CARDIOLOGY  Patient Name: James T Snellen  :1960    Chief Complaint   Patient presents with   • Paroxysmal atrial fibrillation   • Essential hypertension   • Hypertension   • Hyperlipidemia   • Follow-up   :     HPI: James T Snellen is a 62 y.o. male who is a patient of Dr. Erwin and is new to me today.  He has a history of a lacunar infarct in 2020.  He is also had some asymptomatic paroxysmal A. fib in the past.  He has hypertension and his echo has been stable in the past.  We last had a telehealth appointment with him in January of last year.  He had cut back on drinking Mountain Dew's.    Overall he is doing well.  He denies any chest pain, palpitations or shortness of breath.  His blood pressure has been running a little elevated at 150/90 today and he said at home it is in the 140s over 90s.  Previous testing and notes have been reviewed by me.   Past Medical History:   Diagnosis Date   • Afib (HCC)    • Arthritis    • HL (hearing loss)    • Hypertension    • Hypertension    • Lacunar stroke (HCC)    • PAF (paroxysmal atrial fibrillation) (HCC)    • Patient had no falls in past year    • Stroke (HCC)        Past Surgical History:   Procedure Laterality Date   • COLONOSCOPY     • KNEE SURGERY         Social History     Socioeconomic History   • Marital status:    Tobacco Use   • Smoking status: Former Smoker     Packs/day: 1.00     Years: 22.00     Pack years: 22.00     Types: Cigarettes   • Smokeless tobacco: Never Used   • Tobacco comment: caffeine - coffee, soda   Substance and Sexual Activity   • Alcohol use: Yes     Alcohol/week: 5.0 standard drinks     Types: 5 Cans of beer per week     Comment: occas.   • Drug use: Never       Family History   Problem Relation Age of Onset   • Transient ischemic attack Mother    • Arthritis Mother    • Heart  "disease Mother    • Hypertension Mother    • Stroke Mother    • Heart attack Father    • Heart disease Father    • Hypertension Father    • Diabetes Brother        Review of Systems   Constitutional: Negative for diaphoresis and malaise/fatigue.   Cardiovascular: Negative for chest pain, claudication, dyspnea on exertion, irregular heartbeat, leg swelling, near-syncope, orthopnea, palpitations, paroxysmal nocturnal dyspnea and syncope.   Respiratory: Negative for cough, shortness of breath and sleep disturbances due to breathing.    Musculoskeletal: Negative for falls.   Neurological: Negative for dizziness and weakness.   Psychiatric/Behavioral: Negative for altered mental status and substance abuse.       No Known Allergies      Current Outpatient Medications:   •  acetaminophen-codeine (TYLENOL #4) 300-60 MG per tablet, Take 1 tablet by mouth Every 4 (Four) Hours As Needed for Moderate Pain ., Disp: , Rfl:   •  amLODIPine (NORVASC) 5 MG tablet, Take 5 mg by mouth Daily. for blood pressure., Disp: , Rfl:   •  cholecalciferol (VITAMIN D3) 25 MCG (1000 UT) tablet, Take 1,000 Units by mouth Daily., Disp: , Rfl:   •  cyclobenzaprine (FLEXERIL) 10 MG tablet, Take 10 mg by mouth Daily., Disp: , Rfl:   •  dilTIAZem CD (CARDIZEM CD) 360 MG 24 hr capsule, TAKE 1 CAPSULE BY MOUTH DAILY, Disp: 90 capsule, Rfl: 3  •  lisinopril (PRINIVIL,ZESTRIL) 40 MG tablet, Take 40 mg by mouth Daily., Disp: , Rfl:   •  MELATONIN ER PO, Take  by mouth., Disp: , Rfl:   •  metoprolol tartrate (LOPRESSOR) 50 MG tablet, TAKE 1 TABLET BY MOUTH TWICE DAILY, Disp: 180 tablet, Rfl: 3  •  rosuvastatin (CRESTOR) 5 MG tablet, TAKE 1 TABLET BY MOUTH DAILY, Disp: 90 tablet, Rfl: 2  •  Xarelto 20 MG tablet, TAKE 1 TABLET BY MOUTH DAILY, Disp: 30 tablet, Rfl: 11      Objective:     Vitals:    05/06/22 1326   BP: 150/90   BP Location: Left arm   Patient Position: Sitting   Pulse: 64   Weight: 90.7 kg (200 lb)   Height: 177.8 cm (70\")     Body mass index is " 28.7 kg/m².    PHYSICAL EXAM:    Constitutional:       General: Not in acute distress.     Appearance: Normal appearance. Well-developed.   Eyes:      Pupils: Pupils are equal, round, and reactive to light.   HENT:      Head: Normocephalic.   Neck:      Vascular: No carotid bruit or JVD.   Pulmonary:      Effort: Pulmonary effort is normal. No tachypnea.      Breath sounds: Normal breath sounds. No wheezing. No rales.   Cardiovascular:      Normal rate. Regular rhythm.      No gallop.   Pulses:     Intact distal pulses.   Abdominal:      General: Bowel sounds are normal.      Palpations: Abdomen is soft.      Tenderness: There is no abdominal tenderness.   Musculoskeletal: Normal range of motion.      Cervical back: Normal range of motion and neck supple. No edema. Skin:     General: Skin is warm and dry.   Neurological:      Mental Status: Alert and oriented to person, place, and time.           ECG 12 Lead    Date/Time: 5/6/2022 1:46 PM  Performed by: Veda Rothman APRN  Authorized by: Veda Rothman APRN   Comparison: compared with previous ECG   Rhythm: sinus rhythm  Rate: normal  QRS axis: normal    Clinical impression: normal ECG              Assessment:       Diagnosis Plan   1. Primary hypertension     2. Paroxysmal atrial fibrillation (HCC)     3. H/O: stroke       No orders of the defined types were placed in this encounter.         Plan:       He is doing well. Asymptomatic but we need to get his BP better. I am going to split his lisinopril in half and have him take 20mg in the am and 20mg in the pm. He also his having some cramps periodically at night. He drinks a lot of soda and cofee and not a lot of water. I told him to incorporate that or have a banana at night and see if that helps. His most recent labs in October with his PCP were stable. We will see him back in 6 months.    James T Snellen  reports that he has quit smoking. His smoking use included cigarettes. He has a 22.00 pack-year  smoking history. He has never used smokeless tobacco.. I have educated him on the risk of diseases from using tobacco products such as cancer, COPD and heart disease.     I advised him to quit and he is willing to quit. We have discussed the following method/s for tobacco cessation:  Cold [place].  Together we have set a quit date for 1 week from today.  He will follow up with me in 6 months or sooner to check on his progress.    I spent 5 minutes counseling the patient.            Your medication list          Accurate as of May 6, 2022  1:42 PM. If you have any questions, ask your nurse or doctor.            CONTINUE taking these medications      Instructions Last Dose Given Next Dose Due   acetaminophen-codeine 300-60 MG per tablet  Commonly known as: TYLENOL #4      Take 1 tablet by mouth Every 4 (Four) Hours As Needed for Moderate Pain .       amLODIPine 5 MG tablet  Commonly known as: NORVASC      Take 5 mg by mouth Daily. for blood pressure.       cholecalciferol 25 MCG (1000 UT) tablet  Commonly known as: VITAMIN D3      Take 1,000 Units by mouth Daily.       cyclobenzaprine 10 MG tablet  Commonly known as: FLEXERIL      Take 10 mg by mouth Daily.       dilTIAZem  MG 24 hr capsule  Commonly known as: CARDIZEM CD      TAKE 1 CAPSULE BY MOUTH DAILY       lisinopril 40 MG tablet  Commonly known as: PRINIVIL,ZESTRIL      Take 40 mg by mouth Daily.       MELATONIN ER PO      Take  by mouth.       metoprolol tartrate 50 MG tablet  Commonly known as: LOPRESSOR      TAKE 1 TABLET BY MOUTH TWICE DAILY       rosuvastatin 5 MG tablet  Commonly known as: CRESTOR      TAKE 1 TABLET BY MOUTH DAILY       Xarelto 20 MG tablet  Generic drug: rivaroxaban      TAKE 1 TABLET BY MOUTH DAILY                As always, it has been a pleasure to participate in your patient's care.      Sincerely,     Veda RASMUSSEN

## 2022-05-17 RX ORDER — ROSUVASTATIN CALCIUM 5 MG/1
5 TABLET, COATED ORAL DAILY
Qty: 90 TABLET | Refills: 2 | Status: SHIPPED | OUTPATIENT
Start: 2022-05-17 | End: 2022-11-28

## 2022-05-17 RX ORDER — DILTIAZEM HYDROCHLORIDE 360 MG/1
360 CAPSULE, EXTENDED RELEASE ORAL DAILY
Qty: 90 CAPSULE | Refills: 3 | Status: SHIPPED | OUTPATIENT
Start: 2022-05-17

## 2022-11-28 RX ORDER — ROSUVASTATIN CALCIUM 5 MG/1
5 TABLET, COATED ORAL DAILY
Qty: 90 TABLET | Refills: 2 | Status: SHIPPED | OUTPATIENT
Start: 2022-11-28

## 2022-12-16 ENCOUNTER — OFFICE VISIT (OUTPATIENT)
Dept: CARDIOLOGY | Facility: CLINIC | Age: 62
End: 2022-12-16

## 2022-12-16 VITALS
BODY MASS INDEX: 29.18 KG/M2 | SYSTOLIC BLOOD PRESSURE: 150 MMHG | HEIGHT: 70 IN | DIASTOLIC BLOOD PRESSURE: 80 MMHG | HEART RATE: 64 BPM | WEIGHT: 203.8 LBS

## 2022-12-16 DIAGNOSIS — I63.81 LACUNAR STROKE: ICD-10-CM

## 2022-12-16 DIAGNOSIS — I48.0 PAROXYSMAL ATRIAL FIBRILLATION: Primary | ICD-10-CM

## 2022-12-16 DIAGNOSIS — I10 PRIMARY HYPERTENSION: ICD-10-CM

## 2022-12-16 PROCEDURE — 99214 OFFICE O/P EST MOD 30 MIN: CPT | Performed by: INTERNAL MEDICINE

## 2022-12-16 PROCEDURE — 93000 ELECTROCARDIOGRAM COMPLETE: CPT | Performed by: INTERNAL MEDICINE

## 2022-12-16 RX ORDER — AMLODIPINE BESYLATE 10 MG/1
10 TABLET ORAL DAILY
Qty: 90 TABLET | Refills: 3 | Status: SHIPPED | OUTPATIENT
Start: 2022-12-16

## 2022-12-16 NOTE — PROGRESS NOTES
Date of Office Visit: 22  Encounter Provider: Max Erwin MD  Place of Service: [facility] CARDIOLOGY  Patient Name: James T Snellen  :1960  [id number]    Chief Complaint   Patient presents with   • Atrial Fibrillation   :     HPI: James T Snellen is a 62 y.o. male he is a gentleman who had a lacunar infarct and 2020 probably due to hypertension and he subsequently was found to have asymptomatic paroxysmal A. fib.  Normal echo.  We have anticoagulated him but the focus really needs to be on great blood pressure control.  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  He is sadly back smoking    Past Medical History:   Diagnosis Date   • Afib (HCC)    • Arthritis    • HL (hearing loss)    • Hypertension    • Hypertension    • Lacunar stroke (HCC)    • PAF (paroxysmal atrial fibrillation) (HCC)    • Patient had no falls in past year    • Stroke (HCC)        Past Surgical History:   Procedure Laterality Date   • COLONOSCOPY     • KNEE SURGERY         Social History     Socioeconomic History   • Marital status:    Tobacco Use   • Smoking status: Former     Packs/day: 1.00     Years: 22.00     Pack years: 22.00     Types: Cigarettes   • Smokeless tobacco: Never   • Tobacco comments:     caffeine - coffee, soda   Substance and Sexual Activity   • Alcohol use: Yes     Alcohol/week: 5.0 standard drinks     Types: 5 Cans of beer per week     Comment: occas.   • Drug use: Never       Family History   Problem Relation Age of Onset   • Transient ischemic attack Mother    • Arthritis Mother    • Heart disease Mother    • Hypertension Mother    • Stroke Mother    • Heart attack Father    • Heart disease Father    • Hypertension Father    • Diabetes Brother        Review of Systems   Constitutional: Negative for decreased appetite, fever,  "malaise/fatigue and weight loss.   HENT: Negative for nosebleeds.    Eyes: Negative for double vision.   Cardiovascular: Negative for chest pain, claudication, cyanosis, dyspnea on exertion, irregular heartbeat, leg swelling, near-syncope, orthopnea, palpitations, paroxysmal nocturnal dyspnea and syncope.   Respiratory: Negative for cough, hemoptysis and shortness of breath.    Hematologic/Lymphatic: Negative for bleeding problem.   Skin: Negative for rash.   Musculoskeletal: Negative for falls and myalgias.   Gastrointestinal: Negative for hematochezia, jaundice, melena, nausea and vomiting.   Genitourinary: Negative for hematuria.   Neurological: Negative for dizziness and seizures.   Psychiatric/Behavioral: Negative for altered mental status and memory loss.       No Known Allergies      Current Outpatient Medications:   •  acetaminophen-codeine (TYLENOL #4) 300-60 MG per tablet, Take 1 tablet by mouth Every 4 (Four) Hours As Needed for Moderate Pain ., Disp: , Rfl:   •  amLODIPine (NORVASC) 5 MG tablet, Take 5 mg by mouth Daily. for blood pressure., Disp: , Rfl:   •  cholecalciferol (VITAMIN D3) 25 MCG (1000 UT) tablet, Take 1,000 Units by mouth Daily., Disp: , Rfl:   •  cyclobenzaprine (FLEXERIL) 10 MG tablet, Take 10 mg by mouth Daily., Disp: , Rfl:   •  dilTIAZem CD (CARDIZEM CD) 360 MG 24 hr capsule, TAKE 1 CAPSULE BY MOUTH DAILY, Disp: 90 capsule, Rfl: 3  •  lisinopril (PRINIVIL,ZESTRIL) 40 MG tablet, Take 40 mg by mouth Daily., Disp: , Rfl:   •  MELATONIN ER PO, Take  by mouth., Disp: , Rfl:   •  metoprolol tartrate (LOPRESSOR) 50 MG tablet, TAKE 1 TABLET BY MOUTH TWICE DAILY, Disp: 180 tablet, Rfl: 3  •  rosuvastatin (CRESTOR) 5 MG tablet, TAKE 1 TABLET BY MOUTH DAILY, Disp: 90 tablet, Rfl: 2  •  Xarelto 20 MG tablet, TAKE 1 TABLET BY MOUTH DAILY, Disp: 30 tablet, Rfl: 11      Objective:     Vitals:    12/16/22 1352   BP: 150/80   Pulse: 64   Weight: 92.4 kg (203 lb 12.8 oz)   Height: 177.8 cm (70\") "     Body mass index is 29.24 kg/m².    Constitutional:       Appearance: Well-developed.   Eyes:      General: No scleral icterus.  HENT:      Head: Normocephalic.   Neck:      Thyroid: No thyromegaly.      Vascular: No JVD.      Lymphadenopathy: No cervical adenopathy.   Pulmonary:      Effort: Pulmonary effort is normal.      Breath sounds: Normal breath sounds. No wheezing. No rales.   Cardiovascular:      Normal rate. Regular rhythm.      No gallop.   Edema:     Peripheral edema absent.   Abdominal:      Palpations: Abdomen is soft.      Tenderness: There is no abdominal tenderness.   Musculoskeletal: Normal range of motion. Skin:     General: Skin is warm and dry.      Findings: No rash.   Neurological:      Mental Status: Alert and oriented to person, place, and time.           ECG 12 Lead    Date/Time: 12/16/2022 2:36 PM  Performed by: Max Erwin MD  Authorized by: Max Erwin MD   Comparison: compared with previous ECG   Rhythm: sinus rhythm    Clinical impression: normal ECG             Assessment:       Diagnosis Plan   1. Paroxysmal atrial fibrillation (HCC)        2. Lacunar stroke (HCC)        3. Primary hypertension               Plan:       He had a lacunar stroke but is also found to have A. fib but the location of his stroke and I reviewed this with the neuroradiologist is more consistent with a lacunar infarct because of that blood pressure management is critical he is To get off the smokes we talked about that I would increase his amlodipine to 10 mg a day and see if we can get a little bit better blood pressure control he just needs to come back and see us in a year sooner if he has trouble    No follow-ups on file.     As always, it has been a pleasure to participate in your patient's care.      Sincerely,       Max Erwin MD

## 2023-03-31 DIAGNOSIS — I48.0 PAROXYSMAL ATRIAL FIBRILLATION: ICD-10-CM

## 2023-03-31 RX ORDER — RIVAROXABAN 20 MG/1
TABLET, FILM COATED ORAL
Qty: 30 TABLET | Refills: 11 | Status: SHIPPED | OUTPATIENT
Start: 2023-03-31

## 2023-05-18 RX ORDER — DILTIAZEM HYDROCHLORIDE 360 MG/1
CAPSULE, EXTENDED RELEASE ORAL
Qty: 90 CAPSULE | Refills: 3 | Status: SHIPPED | OUTPATIENT
Start: 2023-05-18

## 2023-11-16 DIAGNOSIS — I63.81 LACUNAR STROKE: Primary | ICD-10-CM

## 2023-11-16 RX ORDER — ROSUVASTATIN CALCIUM 5 MG/1
5 TABLET, COATED ORAL DAILY
Qty: 30 TABLET | Refills: 0 | Status: SHIPPED | OUTPATIENT
Start: 2023-11-16

## 2023-12-15 ENCOUNTER — OFFICE VISIT (OUTPATIENT)
Dept: CARDIOLOGY | Facility: CLINIC | Age: 63
End: 2023-12-15
Payer: COMMERCIAL

## 2023-12-15 VITALS
HEART RATE: 59 BPM | HEIGHT: 70 IN | SYSTOLIC BLOOD PRESSURE: 142 MMHG | WEIGHT: 185.6 LBS | DIASTOLIC BLOOD PRESSURE: 70 MMHG | BODY MASS INDEX: 26.57 KG/M2

## 2023-12-15 DIAGNOSIS — I48.0 PAROXYSMAL ATRIAL FIBRILLATION: Primary | ICD-10-CM

## 2023-12-15 DIAGNOSIS — I10 PRIMARY HYPERTENSION: ICD-10-CM

## 2023-12-15 DIAGNOSIS — Z72.0 TOBACCO ABUSE: ICD-10-CM

## 2023-12-15 PROCEDURE — 93000 ELECTROCARDIOGRAM COMPLETE: CPT | Performed by: NURSE PRACTITIONER

## 2023-12-15 PROCEDURE — 99214 OFFICE O/P EST MOD 30 MIN: CPT | Performed by: NURSE PRACTITIONER

## 2023-12-15 RX ORDER — ROSUVASTATIN CALCIUM 5 MG/1
5 TABLET, COATED ORAL DAILY
Qty: 90 TABLET | Refills: 3 | Status: SHIPPED | OUTPATIENT
Start: 2023-12-15

## 2023-12-15 NOTE — PROGRESS NOTES
Date of Office Visit: 12/15/2023  Encounter Provider: GRADY Curry  Place of Service: [facility] CARDIOLOGY  Patient Name: James T Snellen  :1960    Chief Complaint   Patient presents with    Atrial Fibrillation   :     HPI: James T Snellen is a 63 y.o. male patient of Dr. Erwin's with hypertension and paroxysmal atrial fibrillation.  In , he suffered a lacunar infarct secondary to hypertension.    He was last seen in the office by Dr. Erwin in 2022 at which time he was doing well.  He was still smoking which Dr. Erwin discouraged.  His blood pressure was elevated and the amlodipine was increased.  Otherwise, he was advised to follow-up in 1 year.    He has been feeling well.  He denies any chest pain, shortness of breath, palpitations, edema, dizziness, syncope, bleeding difficulties or melena.  He smokes about 1 cigarette a week.  Reportedly his blood pressure is normally in the 110s and 120s systolic.    Past Medical History:   Diagnosis Date    Afib     Arthritis     HL (hearing loss)     Hypertension     Hypertension     Lacunar stroke     PAF (paroxysmal atrial fibrillation)     Patient had no falls in past year     Stroke        Past Surgical History:   Procedure Laterality Date    COLONOSCOPY      KNEE SURGERY         Social History     Socioeconomic History    Marital status:    Tobacco Use    Smoking status: Some Days     Packs/day: 1.00     Years: 22.00     Additional pack years: 0.00     Total pack years: 22.00     Types: Cigarettes    Smokeless tobacco: Never    Tobacco comments:     caffeine - coffee, soda   Vaping Use    Vaping Use: Never used   Substance and Sexual Activity    Alcohol use: Yes     Alcohol/week: 5.0 standard drinks of alcohol     Types: 5 Cans of beer per week     Comment: occas.    Drug use: Never    Sexual activity: Defer       Family History   Problem Relation Age of Onset    Transient ischemic attack Mother      "Arthritis Mother     Heart disease Mother     Hypertension Mother     Stroke Mother     Heart attack Father     Heart disease Father     Hypertension Father     Diabetes Brother        Review of Systems   Constitutional: Negative.   Cardiovascular: Negative.  Negative for chest pain, dyspnea on exertion, leg swelling, orthopnea, paroxysmal nocturnal dyspnea and syncope.   Respiratory: Negative.     Hematologic/Lymphatic: Negative for bleeding problem.   Musculoskeletal:  Negative for falls.   Gastrointestinal:  Negative for melena.   Neurological:  Negative for dizziness and light-headedness.       No Known Allergies      Current Outpatient Medications:     acetaminophen-codeine (TYLENOL #4) 300-60 MG per tablet, Take 1 tablet by mouth Every 4 (Four) Hours As Needed for Moderate Pain. Takes daily, Disp: , Rfl:     amLODIPine (NORVASC) 10 MG tablet, Take 1 tablet by mouth Daily. for blood pressure., Disp: 90 tablet, Rfl: 3    cholecalciferol (VITAMIN D3) 25 MCG (1000 UT) tablet, Take 1 tablet by mouth Daily., Disp: , Rfl:     cyclobenzaprine (FLEXERIL) 10 MG tablet, Take 1 tablet by mouth Daily. As needed, Disp: , Rfl:     dilTIAZem (TIAZAC) 360 MG 24 hr capsule, TAKE 1 CAPSULE BY MOUTH DAILY, Disp: 90 capsule, Rfl: 3    lisinopril (PRINIVIL,ZESTRIL) 40 MG tablet, Take 1 tablet by mouth Daily., Disp: , Rfl:     MELATONIN ER PO, Take  by mouth., Disp: , Rfl:     metoprolol tartrate (LOPRESSOR) 50 MG tablet, TAKE 1 TABLET BY MOUTH TWICE DAILY, Disp: 180 tablet, Rfl: 3    Xarelto 20 MG tablet, TAKE 1 TABLET BY MOUTH DAILY, Disp: 30 tablet, Rfl: 11    rosuvastatin (CRESTOR) 5 MG tablet, TAKE 1 TABLET BY MOUTH DAILY, Disp: 90 tablet, Rfl: 3      Objective:     Vitals:    12/15/23 1231   BP: 142/70   BP Location: Left arm   Patient Position: Sitting   Cuff Size: Adult   Pulse: 59   Weight: 84.2 kg (185 lb 9.6 oz)   Height: 177.8 cm (70\")     Body mass index is 26.63 kg/m².    PHYSICAL EXAM:    Neck:      Vascular: No JVD. "   Pulmonary:      Effort: Pulmonary effort is normal.      Breath sounds: Normal breath sounds.   Cardiovascular:      Normal rate. Regular rhythm.      Murmurs: There is no murmur.      No gallop.  No click. No rub.   Pulses:     Intact distal pulses.           ECG 12 Lead    Date/Time: 12/15/2023 12:44 PM  Performed by: Fidelina Ashton APRN    Authorized by: Fidelina Ashton APRN  Comparison: compared with previous ECG from 12/16/2022  Similar to previous ECG  Rhythm: sinus rhythm  Rate: normal  BPM: 59            Assessment:       Diagnosis Plan   1. Paroxysmal atrial fibrillation  ECG 12 Lead      2. Primary hypertension        3. Tobacco abuse          Orders Placed This Encounter   Procedures    ECG 12 Lead     This order was created via procedure documentation     Order Specific Question:   Release to patient     Answer:   Routine Release [[id number]]          Plan:       1.  Paroxysmal atrial fibrillation.  He is in sinus rhythm today.  He is anticoagulated with Xarelto and rate controlled with diltiazem and metoprolol.      2.  Hypertension.  Reportedly his blood pressure is normally well-controlled.  Continue amlodipine, diltiazem, and lisinopril.      3.  Tobacco abuse.  Smoking cessation encouraged.      I think he is doing well.  I am not recommending any changes, and he will follow-up with Dr. Erwin in 1 year.      As always, it has been a pleasure to participate in your patient's care.      Sincerely,         GRADY Davila

## 2024-01-16 DIAGNOSIS — I48.0 PAROXYSMAL ATRIAL FIBRILLATION: ICD-10-CM

## 2024-01-16 RX ORDER — RIVAROXABAN 20 MG/1
TABLET, FILM COATED ORAL
Qty: 30 TABLET | Refills: 11 | Status: SHIPPED | OUTPATIENT
Start: 2024-01-16

## 2024-01-16 NOTE — TELEPHONE ENCOUNTER
Rx Refill Note  Requested Prescriptions     Pending Prescriptions Disp Refills    Xarelto 20 MG tablet [Pharmacy Med Name: XARELTO 20MG TABLETS] 30 tablet 11     Sig: TAKE 1 TABLET BY MOUTH DAILY      Last office visit with prescribing clinician: 12/15/23  Last telemedicine visit with prescribing clinician: Visit date not found   Next office visit with prescribing clinician: 12/20/24      {TIP  Please add Last Relevant Lab Date if appropriate: 11/16/24                 Would you like a call back once the refill request has been completed: [] Yes [] No    If the office needs to give you a call back, can they leave a voicemail: [] Yes [] No    Bernadine Sapp, [facility]  01/16/24, 08:54 EST      Return in about 1 year (around 12/15/2024) for with Rip.

## 2024-05-28 RX ORDER — DILTIAZEM HYDROCHLORIDE 360 MG/1
CAPSULE, EXTENDED RELEASE ORAL
Qty: 90 CAPSULE | Refills: 3 | Status: SHIPPED | OUTPATIENT
Start: 2024-05-28

## 2024-05-28 NOTE — TELEPHONE ENCOUNTER
LOV w/ GRADY Kitchen 12/15/23  EDGARD w/ Dr. Erwin 12/20/24  Last Labs 12/13/23    Please review and sign.

## 2024-12-23 RX ORDER — ROSUVASTATIN CALCIUM 5 MG/1
5 TABLET, COATED ORAL DAILY
Qty: 30 TABLET | Refills: 0 | Status: SHIPPED | OUTPATIENT
Start: 2024-12-23 | End: 2024-12-30

## 2024-12-30 RX ORDER — ROSUVASTATIN CALCIUM 5 MG/1
5 TABLET, COATED ORAL DAILY
Qty: 30 TABLET | Refills: 0 | Status: SHIPPED | OUTPATIENT
Start: 2024-12-30

## 2025-01-29 ENCOUNTER — OFFICE VISIT (OUTPATIENT)
Dept: CARDIOLOGY | Facility: CLINIC | Age: 65
End: 2025-01-29
Payer: COMMERCIAL

## 2025-01-29 VITALS
HEIGHT: 70 IN | WEIGHT: 193.8 LBS | SYSTOLIC BLOOD PRESSURE: 120 MMHG | DIASTOLIC BLOOD PRESSURE: 68 MMHG | BODY MASS INDEX: 27.75 KG/M2 | HEART RATE: 55 BPM

## 2025-01-29 DIAGNOSIS — Z72.0 TOBACCO ABUSE: ICD-10-CM

## 2025-01-29 DIAGNOSIS — I10 PRIMARY HYPERTENSION: ICD-10-CM

## 2025-01-29 DIAGNOSIS — I63.81 LACUNAR STROKE: ICD-10-CM

## 2025-01-29 DIAGNOSIS — I48.0 PAROXYSMAL ATRIAL FIBRILLATION: Primary | ICD-10-CM

## 2025-01-29 PROBLEM — R73.03 PREDIABETES: Status: ACTIVE | Noted: 2025-01-29

## 2025-01-29 PROCEDURE — 93000 ELECTROCARDIOGRAM COMPLETE: CPT | Performed by: INTERNAL MEDICINE

## 2025-01-29 PROCEDURE — 99214 OFFICE O/P EST MOD 30 MIN: CPT | Performed by: INTERNAL MEDICINE

## 2025-01-29 RX ORDER — ROSUVASTATIN CALCIUM 10 MG/1
10 TABLET, COATED ORAL DAILY
Qty: 90 TABLET | Refills: 3 | Status: SHIPPED | OUTPATIENT
Start: 2025-01-29

## 2025-01-29 NOTE — PROGRESS NOTES
Date of Office Visit: 25  Encounter Provider: Max Erwin MD  Place of Service: [facility] CARDIOLOGY  Patient Name: James T Snellen  :1960  [id number]    Chief Complaint   Patient presents with    Atrial Fibrillation   :     HPI: James T Snellen is a 64 y.o. male he is a gentleman who had a lacunar infarct and 2020 probably due to hypertension and he subsequently was found to have asymptomatic paroxysmal A. fib.  Normal echo.  We have anticoagulated him but the focus really needs to be on great blood pressure control.      He is here for follow-up.  He is not having any chest pain no neurologic symptoms blood pressure control is been great at home as well as here today no bleeding difficulty.  He is occasionally smoking    Past Medical History:   Diagnosis Date    Afib     Arthritis     HL (hearing loss)     Hypertension     Hypertension     Lacunar stroke     PAF (paroxysmal atrial fibrillation)     Patient had no falls in past year     Stroke        Past Surgical History:   Procedure Laterality Date    COLONOSCOPY      KNEE SURGERY         Social History     Socioeconomic History    Marital status:    Tobacco Use    Smoking status: Some Days     Current packs/day: 1.00     Average packs/day: 1 pack/day for 22.0 years (22.0 ttl pk-yrs)     Types: Cigarettes    Smokeless tobacco: Never    Tobacco comments:     caffeine - coffee, soda   Vaping Use    Vaping status: Never Used   Substance and Sexual Activity    Alcohol use: Yes     Alcohol/week: 5.0 standard drinks of alcohol     Types: 5 Cans of beer per week     Comment: occas.    Drug use: Never    Sexual activity: Defer       Family History   Problem Relation Age of Onset    Transient ischemic attack Mother     Arthritis Mother     Heart disease Mother     Hypertension Mother     Stroke Mother     Heart attack Father     Heart disease Father     Hypertension Father     Diabetes Brother        Review of  Systems   Constitutional: Negative for decreased appetite, fever, malaise/fatigue and weight loss.   HENT:  Negative for nosebleeds.    Eyes:  Negative for double vision.   Cardiovascular:  Negative for chest pain, claudication, cyanosis, dyspnea on exertion, irregular heartbeat, leg swelling, near-syncope, orthopnea, palpitations, paroxysmal nocturnal dyspnea and syncope.   Respiratory:  Negative for cough, hemoptysis and shortness of breath.    Hematologic/Lymphatic: Negative for bleeding problem.   Skin:  Negative for rash.   Musculoskeletal:  Negative for falls and myalgias.   Gastrointestinal:  Negative for hematochezia, jaundice, melena, nausea and vomiting.   Genitourinary:  Negative for hematuria.   Neurological:  Negative for dizziness and seizures.   Psychiatric/Behavioral:  Negative for altered mental status and memory loss.        No Known Allergies      Current Outpatient Medications:     acetaminophen-codeine (TYLENOL #4) 300-60 MG per tablet, Take 1 tablet by mouth Every 4 (Four) Hours As Needed for Moderate Pain. Takes daily, Disp: , Rfl:     amLODIPine (NORVASC) 10 MG tablet, Take 1 tablet by mouth Daily. for blood pressure., Disp: 90 tablet, Rfl: 3    cholecalciferol (VITAMIN D3) 25 MCG (1000 UT) tablet, Take 1 tablet by mouth Daily., Disp: , Rfl:     dilTIAZem (TIAZAC) 360 MG 24 hr capsule, TAKE 1 CAPSULE BY MOUTH DAILY, Disp: 90 capsule, Rfl: 3    lisinopril (PRINIVIL,ZESTRIL) 40 MG tablet, Take 1 tablet by mouth Daily., Disp: , Rfl:     MAGNESIUM CITRATE PO, Take  by mouth., Disp: , Rfl:     MELATONIN ER PO, Take  by mouth., Disp: , Rfl:     metoprolol tartrate (LOPRESSOR) 50 MG tablet, TAKE 1 TABLET BY MOUTH TWICE DAILY, Disp: 180 tablet, Rfl: 3    rosuvastatin (CRESTOR) 5 MG tablet, TAKE 1 TABLET BY MOUTH DAILY, Disp: 30 tablet, Rfl: 0    Xarelto 20 MG tablet, TAKE 1 TABLET BY MOUTH DAILY, Disp: 30 tablet, Rfl: 11    cyclobenzaprine (FLEXERIL) 10 MG tablet, Take 1 tablet by mouth Daily. As  "needed (Patient not taking: Reported on 1/29/2025), Disp: , Rfl:       Objective:     Vitals:    01/29/25 1322   BP: 120/68   Pulse: 55   Weight: 87.9 kg (193 lb 12.8 oz)   Height: 177.8 cm (70\")     Body mass index is 27.81 kg/m².    Constitutional:       Appearance: Well-developed.   Eyes:      General: No scleral icterus.  HENT:      Head: Normocephalic.   Neck:      Thyroid: No thyromegaly.      Vascular: No JVD.      Lymphadenopathy: No cervical adenopathy.   Pulmonary:      Effort: Pulmonary effort is normal.      Breath sounds: Normal breath sounds. No wheezing. No rales.   Cardiovascular:      Normal rate. Regular rhythm.      No gallop.    Edema:     Peripheral edema absent.   Abdominal:      Palpations: Abdomen is soft.      Tenderness: There is no abdominal tenderness.   Musculoskeletal: Normal range of motion. Skin:     General: Skin is warm and dry.      Findings: No rash.   Neurological:      Mental Status: Alert and oriented to person, place, and time.           ECG 12 Lead    Date/Time: 1/29/2025 2:03 PM  Performed by: Max Erwin MD    Authorized by: Max Erwin MD  Rhythm: sinus rhythm    Clinical impression: normal ECG           Assessment:       Diagnosis Plan   1. Paroxysmal atrial fibrillation        2. Primary hypertension               Plan:     He had a lacunar stroke which is usually a small vessel stroke and we need to treat his blood vessel disease risk.  I am going to increase his Crestor from 5-10 and try and get his LDL under 55.  I have encouraged him strongly to stop smoking.  His blood pressure looks great.  I called his PCP to asked them to check his lipids around 1 May and he said they would get that done.  I also ask him to think about put him on metformin he has always had a high A1c does not ever meet the criteria for clear diabetes but we know that people in his condition have more events and he is already had a stroke from our standpoint we will see him back in a " year I do not think he has had any A-fib    No follow-ups on file.     As always, it has been a pleasure to participate in your patient's care.      Sincerely,       Max Erwin MD

## 2025-02-15 DIAGNOSIS — I48.0 PAROXYSMAL ATRIAL FIBRILLATION: ICD-10-CM

## 2025-02-17 RX ORDER — RIVAROXABAN 20 MG/1
TABLET, FILM COATED ORAL
Qty: 30 TABLET | Refills: 11 | Status: SHIPPED | OUTPATIENT
Start: 2025-02-17

## 2025-03-05 RX ORDER — DILTIAZEM HYDROCHLORIDE 360 MG/1
CAPSULE, EXTENDED RELEASE ORAL
Qty: 90 CAPSULE | Refills: 3 | Status: SHIPPED | OUTPATIENT
Start: 2025-03-05